# Patient Record
Sex: MALE | Race: WHITE | Employment: OTHER | URBAN - METROPOLITAN AREA
[De-identification: names, ages, dates, MRNs, and addresses within clinical notes are randomized per-mention and may not be internally consistent; named-entity substitution may affect disease eponyms.]

---

## 2017-05-03 RX ORDER — PRASUGREL 10 MG/1
10 TABLET, FILM COATED ORAL DAILY
Qty: 90 TABLET | Refills: 3 | Status: SHIPPED | OUTPATIENT
Start: 2017-05-03 | End: 2019-09-25 | Stop reason: SDUPTHER

## 2018-11-12 ENCOUNTER — TELEPHONE (OUTPATIENT)
Dept: CARDIOLOGY CLINIC | Age: 58
End: 2018-11-12

## 2018-11-12 ENCOUNTER — OFFICE VISIT (OUTPATIENT)
Dept: CARDIOLOGY CLINIC | Age: 58
End: 2018-11-12
Payer: COMMERCIAL

## 2018-11-12 VITALS
HEIGHT: 73 IN | HEART RATE: 58 BPM | DIASTOLIC BLOOD PRESSURE: 88 MMHG | SYSTOLIC BLOOD PRESSURE: 138 MMHG | BODY MASS INDEX: 25.71 KG/M2 | WEIGHT: 194 LBS

## 2018-11-12 DIAGNOSIS — Z98.62 S/P ANGIOPLASTY: Primary | ICD-10-CM

## 2018-11-12 DIAGNOSIS — Z01.810 PREOP CARDIOVASCULAR EXAM: ICD-10-CM

## 2018-11-12 DIAGNOSIS — I73.9 PAD (PERIPHERAL ARTERY DISEASE) (HCC): ICD-10-CM

## 2018-11-12 DIAGNOSIS — I21.09 ST ELEVATION MYOCARDIAL INFARCTION (STEMI) OF ANTERIOR WALL, INITIAL EPISODE OF CARE (HCC): ICD-10-CM

## 2018-11-12 PROCEDURE — 93000 ELECTROCARDIOGRAM COMPLETE: CPT | Performed by: INTERNAL MEDICINE

## 2018-11-12 PROCEDURE — 99214 OFFICE O/P EST MOD 30 MIN: CPT | Performed by: INTERNAL MEDICINE

## 2018-11-12 RX ORDER — ENALAPRIL MALEATE 10 MG/1
TABLET ORAL
Refills: 5 | COMMUNITY
Start: 2018-10-12 | End: 2020-11-16

## 2018-11-12 RX ORDER — INSULIN GLARGINE 300 U/ML
INJECTION, SOLUTION SUBCUTANEOUS
Refills: 2 | COMMUNITY
Start: 2018-10-12 | End: 2020-12-07

## 2018-11-12 NOTE — PROGRESS NOTES
exam in normal, teeth, gum and palate are normal, oral mucosa is normal without any notation of pallor or cyanosis  Neck - Supple. No jugular venous distention noted. No carotid bruits, no apical -carotid delay  Respiratory:  Normal breath sounds, No respiratory distress, No wheezing, No chest tenderness. ,no use of accessory muscles, diaphragm movement is normal  Cardiovascular: (PMI) apex non displaced,no lifts no thrills, no s3,no s4, Normal heart rate, Normal rhythm, No murmurs, No rubs, No gallops. Carotid arteries pulse and amplitude are normal no bruit, no abdominal bruit noted ( normal abdominal aorta ausculation), femoral arteries pulse and amplitude are normal no bruit, pedal pulses are normal  Femoral pulses have normal amplitude, no bruits   Extremities - No cyanosis, clubbing, or significant edema, no varicose veins    Abdomen - No masses, tenderness, or organomegaly, no hepato-splenomegally, no bruits  Musculoskeletal - No significant edema, no kyphosis or scoliosis, no deformity in any extremity noted, muscle strength and tone are normal  Skin: no ulcer,no scar,no stasis dermatitis   Neurologic - alert oriented times 3,Cranial nerves II through XII are grossly intact. There were no gross focal neurologic abnormalities. All sensory and motor nerves examined and were normal  Psychiatric: normal mood and affect    No results found for: CKTOTAL, CKMB, CKMBINDEX, TROPONINI  BNP:  No results found for: BNP  PT/INR:  No results found for: PTINR  Lab Results   Component Value Date    LABA1C 8.7 (H) 07/27/2016    LABA1C 8.5 (H) 04/22/2016     Lab Results   Component Value Date    CHOL 217 (H) 07/27/2016    TRIG 262 (H) 07/27/2016    HDL 32 (L) 07/27/2016    LDLDIRECT 147 (H) 07/27/2016     Lab Results   Component Value Date    ALT 17 07/31/2018    AST 13 (L) 07/31/2018     TSH:  No results found for: TSH    Impression:  Iris Servin is a 59 y. o.year old who  has a past medical history of Abnormal angiogram; Acute MI, anterior wall ([facility] CAD S/P percutaneous coronary angioplasty; Chest pain; Diabetes mellitus ([facility] H/O cardiovascular stress test; H/O Doppler ultrasound; H/O heart artery stent; H/O percutaneous left heart catheterization; H/O percutaneous left heart catheterization; H/O percutaneous left heart catheterization; H/O unstable angina; H/O unstable angina; History of Doppler ultrasound; Hypertension; Lower ext. Arterial Doppler; PAD (peripheral artery disease) ([facility] S/P angioplasty with stent; Smoker; and STEMI (ST elevation myocardial infarction) ([facility] and presents with     Plan:  1. Preop\" ok to hold effient for dental work.echo ordered  2. CAD:stable, Continue aspirin and plavix for atleast one yr, continue statins, ACEinhibitors, betablockers  3. DM: stable continue metformin   4. Dyslipidemia: uncontrolled,continue statins and add praluent  5. HTN: stable, continue lopressor and lisinopril medications  6. PAD: stable, recheck arterial doppler./  7. Health maintenance: exerise and diet  All labs, medications and tests reviewed, continue all other medications of all above medical condition listed as is.     [unfilled]

## 2018-11-12 NOTE — PATIENT INSTRUCTIONS
Please remember to bring all medication bottles or a medication list with you to your appointment. If you have any questions, please call our office at [phone].

## 2018-11-21 ENCOUNTER — TELEPHONE (OUTPATIENT)
Dept: CARDIOLOGY CLINIC | Age: 58
End: 2018-11-21

## 2019-05-13 ENCOUNTER — OFFICE VISIT (OUTPATIENT)
Dept: CARDIOLOGY CLINIC | Age: 59
End: 2019-05-13
Payer: COMMERCIAL

## 2019-05-13 VITALS
BODY MASS INDEX: 25.18 KG/M2 | WEIGHT: 190 LBS | DIASTOLIC BLOOD PRESSURE: 80 MMHG | HEART RATE: 60 BPM | HEIGHT: 73 IN | SYSTOLIC BLOOD PRESSURE: 132 MMHG

## 2019-05-13 DIAGNOSIS — I25.10 CORONARY ARTERY DISEASE INVOLVING NATIVE CORONARY ARTERY OF NATIVE HEART WITHOUT ANGINA PECTORIS: Primary | ICD-10-CM

## 2019-05-13 PROCEDURE — 99214 OFFICE O/P EST MOD 30 MIN: CPT | Performed by: INTERNAL MEDICINE

## 2019-05-13 RX ORDER — SILDENAFIL 100 MG/1
100 TABLET, FILM COATED ORAL DAILY PRN
Qty: 6 TABLET | Refills: 3 | Status: SHIPPED | OUTPATIENT
Start: 2019-05-13 | End: 2020-07-16 | Stop reason: SDUPTHER

## 2019-05-13 NOTE — PROGRESS NOTES
Christopher Monge MD        OFFICE  FOLLOWUP NOTE    Chief complaints: patient is here for management of CAD, DM, HTN, PAD, DYSLPIDEMIA    Subjective: he vomited last night, he is dropped off    HPI Jagdeep Leong is a 62 y. o.year old who  has a past medical history of Abnormal angiogram, Acute MI, anterior wall ([facility] CAD S/P percutaneous coronary angioplasty, Chest pain, Diabetes mellitus ([facility] H/O cardiovascular stress test, H/O Doppler ultrasound, H/O heart artery stent, H/O percutaneous left heart catheterization, H/O percutaneous left heart catheterization, H/O percutaneous left heart catheterization, H/O unstable angina, H/O unstable angina, History of Doppler ultrasound, Hypertension, Lower ext. Arterial Doppler, PAD (peripheral artery disease) ([facility] S/P angioplasty with stent, Smoker, and STEMI (ST elevation myocardial infarction) ([facility] and presents for management of CAD, DM, HTN, PAD, DYSLPIDEMIA which are well controlled      Current Outpatient Medications   Medication Sig Dispense Refill    enalapril (VASOTEC) 10 MG tablet TAKE 1 TABLET BY MOUTH DAILY  5    TOUJEO SOLOSTAR 300 UNIT/ML injection pen INJECT 10 UNITS SUB Q EVERY EVENING  2    prasugrel (EFFIENT) 10 MG TABS Take 1 tablet by mouth daily 90 tablet 3    enalapril (VASOTEC) 5 MG tablet TAKE 1 TABLET BY MOUTH DAILY 30 tablet 10    atorvastatin (LIPITOR) 80 MG tablet Take 1 tablet by mouth daily 90 tablet 3    metoprolol (LOPRESSOR) 25 MG tablet Take 0.5 tablets by mouth 2 times daily (Patient taking differently: Take 25 mg by mouth 2 times daily ) 30 tablet 5    amLODIPine (NORVASC) 10 MG tablet Take 10 mg by mouth daily.  aspirin 81 MG EC tablet Take 1 tablet by mouth daily. 30 tablet 3    metformin (GLUCOPHAGE-XR) 500 MG XR tablet Take 500 mg by mouth daily (with breakfast)        No current facility-administered medications for this visit. Allergies: Patient has no known allergies.   Past Medical History: Diagnosis Date    Abnormal angiogram 4/15/15    100% left iliac instent restenosis, UnSuccessful PTA of rt iliac artery, PTA @ [facility]    Acute MI, anterior wall ([address] 3/1/15    CAD S/P percutaneous coronary angioplasty 3/16/15    2.75 X 12 MM, 3.00 X 30 MM 2.75 X 8 MM rca     Chest pain     Diabetes mellitus ([address]     H/O cardiovascular stress test 3/4/2016    treadmill    H/O Doppler ultrasound 3/12/15    Arterial doppler. Abnormal right leg NOLAN suggestive of moderate to severe stenosis in the area of internal iliac artery which appears to be completely stenotic. I would recommend right lower extremity angiography.  H/O heart artery stent 3/17/16    3.0 X 22 MM & 3.0 X 18 MM sharmaine to RCA.    H/O percutaneous left heart catheterization 3/1/15    100% LAD, 90% RCA, 100% R. iliac stenosis    H/O percutaneous left heart catheterization 3/16/15    95% mid & 70% prox RCA    H/O percutaneous left heart catheterization 3/17/16    RCA mid 80%,     H/O unstable angina     H/O unstable angina     History of Doppler ultrasound 04/22/2016    Carotid-there is no flow limiting lesions BLT     Hypertension     Lower ext.  Arterial Doppler 10/26/2016    The  study is normal    PAD (peripheral artery disease) ([facility])     100% rt iliac stenosis    S/P angioplasty with stent 3/1/15    Successful aspiration thrombectomy and stenting of the proximal LAD    Smoker     STEMI (ST elevation myocardial infarction) ([facility] 3/1/15     Past Surgical History:   Procedure Laterality Date    CARDIAC CATHETERIZATION  3/1/15    100% LAD, 90% RCA, 100% R. iliac stenosis    CARDIAC CATHETERIZATION  3/16/15    95% mid & 70% prox RCA    CARDIAC CATHETERIZATION  3/17/16    RCA mid 80%,     CORONARY ANGIOPLASTY WITH STENT PLACEMENT  3/1/15    Successful aspiration thrombectomy and stenting of the proximal LAD    CORONARY ANGIOPLASTY WITH STENT PLACEMENT  3/16/15    2.75 X 12 MM, 3.00 X 30 MM 2.75 X 8 MM rca     CORONARY ANGIOPLASTY WITH STENT PLACEMENT  3/17/16    3.0 X 22 MM & 3.0 X 18 MM sharmaine to RCA. Family History   Problem Relation Age of Onset    Cancer Father     Diabetes Father     Heart Disease Father     High Blood Pressure Father      Social History     Tobacco Use    Smoking status: Former Smoker     Packs/day: 0.25     Years: 25.00     Pack years: 6.25     Types: Cigarettes     Last attempt to quit: 3/1/2015     Years since quittin.2    Smokeless tobacco: Never Used    Tobacco comment: reviewed 08/26/15   Substance Use Topics    Alcohol use: No     Alcohol/week: 0.0 oz      [unfilled]  Review of Systems:   · Constitutional: No Fever or Weight Loss   · Eyes: No Decreased Vision  · ENT: No Headaches, Hearing Loss or Vertigo  · Cardiovascular: No chest pain, dyspnea on exertion, palpitations or loss of consciousness  · Respiratory: No cough or wheezing    · Gastrointestinal: No abdominal pain, appetite loss, blood in stools, constipation, diarrhea or heartburn  · Genitourinary: No dysuria, trouble voiding, or hematuria  · Musculoskeletal:  No gait disturbance, weakness or joint complaints  · Integumentary: No rash or pruritis  · Neurological: No TIA or stroke symptoms  · Psychiatric: No anxiety or depression  · Endocrine: No malaise, fatigue or temperature intolerance  · Hematologic/Lymphatic: No bleeding problems, blood clots or swollen lymph nodes  · Allergic/Immunologic: No nasal congestion or hives  All systems negative except as marked. Objective:  /80   Pulse 60   Ht 6' 1\" (1.854 m)   Wt 190 lb (86.2 kg)   BMI 25.07 kg/m²   Wt Readings from Last 3 Encounters:   19 190 lb (86.2 kg)   18 194 lb (88 kg)   18 198 lb (89.8 kg)     Body mass index is 25.07 kg/m².   GENERAL - Alert, oriented, pleasant, in no apparent distress,normal grooming  HEENT - pupils are reactive to light and accomodation, cornea intact, conjunctive normal color, ears are normal in exam,throat exam in normal, teeth, gum and palate are normal, oral mucosa is normal without any notation of pallor or cyanosis  Neck - Supple. No jugular venous distention noted. No carotid bruits, no apical -carotid delay  Respiratory:  Normal breath sounds, No respiratory distress, No wheezing, No chest tenderness. ,no use of accessory muscles, diaphragm movement is normal  Cardiovascular: (PMI) apex non displaced,no lifts no thrills, no s3,no s4, Normal heart rate, Normal rhythm, No murmurs, No rubs, No gallops. Carotid arteries pulse and amplitude are normal no bruit, no abdominal bruit noted ( normal abdominal aorta ausculation), femoral arteries pulse and amplitude are normal no bruit, pedal pulses are normal  Femoral pulses have normal amplitude, no bruits   Extremities - No cyanosis, clubbing, or significant edema, no varicose veins    Abdomen - No masses, tenderness, or organomegaly, no hepato-splenomegally, no bruits  Musculoskeletal - No significant edema, no kyphosis or scoliosis, no deformity in any extremity noted, muscle strength and tone are normal  Skin: no ulcer,no scar,no stasis dermatitis   Neurologic - alert oriented times 3,Cranial nerves II through XII are grossly intact. There were no gross focal neurologic abnormalities. All sensory and motor nerves examined and were normal  Psychiatric: normal mood and affect    No results found for: CKTOTAL, CKMB, CKMBINDEX, TROPONINI  BNP:  No results found for: BNP  PT/INR:  No results found for: PTINR  Lab Results   Component Value Date    LABA1C 8.7 (H) 07/27/2016    LABA1C 8.5 (H) 04/22/2016     Lab Results   Component Value Date    CHOL 217 (H) 07/27/2016    TRIG 262 (H) 07/27/2016    HDL 32 (L) 07/27/2016    LDLDIRECT 147 (H) 07/27/2016     Lab Results   Component Value Date    ALT 17 07/31/2018    AST 13 (L) 07/31/2018     TSH:  No results found for: TSH    Impression:  Candy Zamora is a 62 y. o.year old who  has a past medical history of Abnormal angiogram, Acute MI, anterior wall ([address] CAD S/P percutaneous coronary angioplasty, Chest pain, Diabetes mellitus ([facility] H/O cardiovascular stress test, H/O Doppler ultrasound, H/O heart artery stent, H/O percutaneous left heart catheterization, H/O percutaneous left heart catheterization, H/O percutaneous left heart catheterization, H/O unstable angina, H/O unstable angina, History of Doppler ultrasound, Hypertension, Lower ext. Arterial Doppler, PAD (peripheral artery disease) ([facility] S/P angioplasty with stent, Smoker, and STEMI (ST elevation myocardial infarction) ([facility] and presents with     Plan:  1. Vomiting:recommend to see PMD  2. CAD:stable, Continue aspirin and plavix for atleast one yr, continue statins, ACEinhibitors, betablockers, stress test ordered  3. DM: stable continue metformin   4. Dyslipidemia:recheck  Lipids, last time it was not controlled  5. HTN: stable, continue lopressor and lisinopril medications  6. PAD: stable, recheck arterial doppler.   7. ED: add viagra

## 2019-05-13 NOTE — PATIENT INSTRUCTIONS
Please hold on to these instructions the  will call you within 1-9 business days when we receive authorization from your insurance. Nuclear Stress Test    WHAT TO EXPECT:   ? You will need to confirm the test or it could be cancelled. ? This test will take approximately 2 hours: 1 hour in the AM &    1 hour in the PM. You will be given a time by the [facility] after the first  part is completed to come back. ? You will be given a medication, through an IV in the hand, this will safely simulate exercise. This IV is also needed to inject the radioactive isotope. ? You will receive an injection in the AM & PM before the pictures. ? Using a special camera, you will have one set of pictures of your heart taken in the AM and a set of pictures in the PM.     PREPARATION FOR TEST:  ? Eat a light breakfast such as juice and toast.  ? If you are DIABETIC: Eat a normal breakfast with NO CAFFEINE and take your insulin as normal.   ? AVOID ALL FOODS & DRINKS containing CAFFEINE 24 HOURS PRIOR TO THE TEST: Including coffee, Tea, Crescencio and other soft drinks even those labeled  caffeine free or decaffeinated.  ? HOLD THESE MEDICATIONS Persantine & Theophylline (Theodur)  24 hours prior & bring your inhaler with you. ?  The physician will specify if the following Beta blockers need to be held for 24 hours prior to test: Lopressor (metoprolol)

## 2019-05-15 ENCOUNTER — TELEPHONE (OUTPATIENT)
Dept: CARDIOLOGY CLINIC | Age: 59
End: 2019-05-15

## 2019-05-20 ENCOUNTER — TELEPHONE (OUTPATIENT)
Dept: CARDIOLOGY CLINIC | Age: 59
End: 2019-05-20

## 2019-05-20 NOTE — TELEPHONE ENCOUNTER
Called patient to schedule Treadmill, no answer. Left message for patient to call office to schedule.  Tangela GARCIA AUTH NEEDED OK TO SCHEDULE

## 2019-06-03 ENCOUNTER — PROCEDURE VISIT (OUTPATIENT)
Dept: CARDIOLOGY CLINIC | Age: 59
End: 2019-06-03
Payer: COMMERCIAL

## 2019-06-03 VITALS
HEIGHT: 73 IN | SYSTOLIC BLOOD PRESSURE: 148 MMHG | BODY MASS INDEX: 25.18 KG/M2 | WEIGHT: 190 LBS | DIASTOLIC BLOOD PRESSURE: 80 MMHG | HEART RATE: 63 BPM

## 2019-06-03 DIAGNOSIS — I25.10 CORONARY ARTERY DISEASE INVOLVING NATIVE CORONARY ARTERY OF NATIVE HEART WITHOUT ANGINA PECTORIS: ICD-10-CM

## 2019-06-03 DIAGNOSIS — R94.39 ABNORMAL STRESS TEST: ICD-10-CM

## 2019-06-03 DIAGNOSIS — R94.31 ABNORMAL EKG: Primary | ICD-10-CM

## 2019-06-03 DIAGNOSIS — R07.9 CHEST PAIN, UNSPECIFIED TYPE: ICD-10-CM

## 2019-06-03 DIAGNOSIS — R07.9 CHEST PAIN, UNSPECIFIED TYPE: Primary | ICD-10-CM

## 2019-06-03 DIAGNOSIS — R06.02 SHORTNESS OF BREATH: ICD-10-CM

## 2019-06-03 PROCEDURE — 93015 CV STRESS TEST SUPVJ I&R: CPT | Performed by: INTERNAL MEDICINE

## 2019-06-03 NOTE — PROGRESS NOTES
Reg.GXT completed @ 8213. Had Margie Sears NP review GXT. She also reviewed pt's last EKG from  w/today's EKGs. She spoke w/ pt's cardiologist per phone about pt's abn.stress test & EKGs, d/t DrHerberthof day not in office until 0900. Dacosta Judy wants pt to have NM. Informed pt of this & he voiced understanding w/agreement. NM placed in Epic as future order & informed Alana. Pt is aware that this office will call him back to schedule NM after his insurance is called to make sure study is covered.

## 2019-06-24 ENCOUNTER — PROCEDURE VISIT (OUTPATIENT)
Dept: CARDIOLOGY CLINIC | Age: 59
End: 2019-06-24
Payer: COMMERCIAL

## 2019-06-24 DIAGNOSIS — R94.39 ABNORMAL STRESS TEST: ICD-10-CM

## 2019-06-24 DIAGNOSIS — R07.9 CHEST PAIN, UNSPECIFIED TYPE: ICD-10-CM

## 2019-06-24 DIAGNOSIS — R94.31 ABNORMAL EKG: ICD-10-CM

## 2019-06-24 DIAGNOSIS — I25.10 CORONARY ARTERY DISEASE INVOLVING NATIVE CORONARY ARTERY OF NATIVE HEART WITHOUT ANGINA PECTORIS: ICD-10-CM

## 2019-06-24 DIAGNOSIS — R06.02 SHORTNESS OF BREATH: ICD-10-CM

## 2019-06-24 LAB
LV EF: 49 %
LVEF MODALITY: NORMAL

## 2019-06-24 PROCEDURE — 78452 HT MUSCLE IMAGE SPECT MULT: CPT | Performed by: INTERNAL MEDICINE

## 2019-06-24 PROCEDURE — 93017 CV STRESS TEST TRACING ONLY: CPT | Performed by: INTERNAL MEDICINE

## 2019-06-24 PROCEDURE — 93018 CV STRESS TEST I&R ONLY: CPT | Performed by: INTERNAL MEDICINE

## 2019-06-24 PROCEDURE — 93016 CV STRESS TEST SUPVJ ONLY: CPT | Performed by: INTERNAL MEDICINE

## 2019-06-24 PROCEDURE — A9500 TC99M SESTAMIBI: HCPCS | Performed by: INTERNAL MEDICINE

## 2019-06-25 ENCOUNTER — TELEPHONE (OUTPATIENT)
Dept: CARDIOLOGY CLINIC | Age: 59
End: 2019-06-25

## 2019-06-25 NOTE — TELEPHONE ENCOUNTER
Left VM for pt to call me for results. Summary    Supervising physician Dr. Bjorn Munguia .abnormal stress test, slightly depressed    LVEF.  Myocardial perfusion scan shows small size, moderate intensity,    reversible perfusion defect in anterior wall.        Recommendation    office visit to discuss results

## 2019-07-05 ENCOUNTER — OFFICE VISIT (OUTPATIENT)
Dept: CARDIOLOGY CLINIC | Age: 59
End: 2019-07-05
Payer: COMMERCIAL

## 2019-07-05 VITALS
HEART RATE: 68 BPM | SYSTOLIC BLOOD PRESSURE: 148 MMHG | WEIGHT: 195.4 LBS | BODY MASS INDEX: 25.9 KG/M2 | DIASTOLIC BLOOD PRESSURE: 82 MMHG | HEIGHT: 73 IN

## 2019-07-05 DIAGNOSIS — I25.10 CORONARY ARTERY DISEASE INVOLVING NATIVE CORONARY ARTERY OF NATIVE HEART WITHOUT ANGINA PECTORIS: Primary | ICD-10-CM

## 2019-07-05 PROCEDURE — 99214 OFFICE O/P EST MOD 30 MIN: CPT | Performed by: INTERNAL MEDICINE

## 2019-07-05 NOTE — PROGRESS NOTES
Jerry Prakash MD        OFFICE  FOLLOWUP NOTE    Chief complaints: patient is here for management of  CAD, DM, HTN, PAD, DYSLPIDEMIA      Subjective: patient feels tired in the extreme heat    HPI Abner Dennis is a 62 y. o.year old who  has a past medical history of Abnormal angiogram, Chest pain, Diabetes mellitus ([facility] H/O cardiovascular stress test, H/O Doppler ultrasound, H/O heart artery stent, H/O percutaneous left heart catheterization, H/O percutaneous left heart catheterization, H/O percutaneous left heart catheterization, H/O unstable angina, History of Doppler ultrasound, Hypertension, Lower ext. Arterial Doppler, PAD (peripheral artery disease) ([facility] S/P angioplasty with stent, Smoker, and STEMI (ST elevation myocardial infarction) ([facility] and presents for management of  CAD, DM, HTN, PAD, DYSLPIDEMIA which are well controlled. His stress test suggested some EKG ischemia and ? Anterior wall ischemia    Current Outpatient Medications   Medication Sig Dispense Refill    sildenafil (VIAGRA) 100 MG tablet Take 1 tablet by mouth daily as needed for Erectile Dysfunction 6 tablet 3    enalapril (VASOTEC) 10 MG tablet TAKE 1 TABLET BY MOUTH DAILY  5    TOUJEO SOLOSTAR 300 UNIT/ML injection pen INJECT 10 UNITS SUB Q EVERY EVENING  2    prasugrel (EFFIENT) 10 MG TABS Take 1 tablet by mouth daily 90 tablet 3    enalapril (VASOTEC) 5 MG tablet TAKE 1 TABLET BY MOUTH DAILY 30 tablet 10    atorvastatin (LIPITOR) 80 MG tablet Take 1 tablet by mouth daily 90 tablet 3    metoprolol (LOPRESSOR) 25 MG tablet Take 0.5 tablets by mouth 2 times daily (Patient taking differently: Take 25 mg by mouth 2 times daily ) 30 tablet 5    amLODIPine (NORVASC) 10 MG tablet Take 10 mg by mouth daily.  aspirin 81 MG EC tablet Take 1 tablet by mouth daily.  30 tablet 3    metformin (GLUCOPHAGE-XR) 500 MG XR tablet Take 500 mg by mouth daily (with breakfast)        No current facility-administered medications for Diabetes mellitus ([facility] H/O cardiovascular stress test, H/O Doppler ultrasound, H/O heart artery stent, H/O percutaneous left heart catheterization, H/O percutaneous left heart catheterization, H/O percutaneous left heart catheterization, H/O unstable angina, History of Doppler ultrasound, Hypertension, Lower ext. Arterial Doppler, P[facility]peripheral artery disease) [facility] S/P angioplasty with stent, Smoker, and STEMI (ST elevation myocardial infarction) ([facility] and presents with     Plan:  1. Abnormal stress test: reviewed stress test personally, ischemia is very mild if at all, will not proceed with [facility]. 2. CAD:stable, Continue aspirin and plavix for atleast one yr, continue statins, ACEinhibitors, betablockers  3. DM: stable continue metformin   4. Dyslipidemia:recheck  Lipids, last time it was not controlled  5. HTN: stable, continue lopressor and lisinopril medications  6. PAD: stable, recheck arterial doppler. 7. ED: add viagra      All labs, medications and tests reviewed, continue all other medications of all above medical condition listed as is.

## 2019-07-11 ENCOUNTER — HOSPITAL ENCOUNTER (EMERGENCY)
Age: 59
Discharge: HOME | End: 2019-07-11
Attending: EMERGENCY MEDICINE
Payer: COMMERCIAL

## 2019-07-11 ENCOUNTER — APPOINTMENT (OUTPATIENT)
Dept: CT IMAGING | Age: 59
End: 2019-07-11
Payer: COMMERCIAL

## 2019-07-11 VITALS
HEIGHT: 73 IN | TEMPERATURE: 97.9 F | DIASTOLIC BLOOD PRESSURE: 85 MMHG | WEIGHT: 196 LBS | BODY MASS INDEX: 25.98 KG/M2 | SYSTOLIC BLOOD PRESSURE: 167 MMHG | HEART RATE: 52 BPM | OXYGEN SATURATION: 96 % | RESPIRATION RATE: 18 BRPM

## 2019-07-11 DIAGNOSIS — N20.0 KIDNEY STONE: ICD-10-CM

## 2019-07-11 DIAGNOSIS — R10.9 LEFT FLANK PAIN: Primary | ICD-10-CM

## 2019-07-11 LAB
ANION GAP SERPL CALCULATED.3IONS-SCNC: 13 MMOL/L (ref 4–16)
APTT: 27.7 SECONDS (ref 21.2–33)
BACTERIA: NEGATIVE /HPF
BASOPHILS ABSOLUTE: 0 K/CU MM
BASOPHILS RELATIVE PERCENT: 0.3 % (ref 0–1)
BILIRUBIN URINE: NEGATIVE MG/DL
BLOOD, URINE: ABNORMAL
BUN BLDV-MCNC: 17 MG/DL (ref 6–23)
CALCIUM SERPL-MCNC: 9.3 MG/DL (ref 8.3–10.6)
CHLORIDE BLD-SCNC: 102 MMOL/L (ref 99–110)
CLARITY: ABNORMAL
CO2: 25 MMOL/L (ref 21–32)
COLOR: ABNORMAL
CREAT SERPL-MCNC: 1.2 MG/DL (ref 0.9–1.3)
DIFFERENTIAL TYPE: ABNORMAL
EOSINOPHILS ABSOLUTE: 0.3 K/CU MM
EOSINOPHILS RELATIVE PERCENT: 2.5 % (ref 0–3)
GFR AFRICAN AMERICAN: >60 ML/MIN/1.73M2
GFR NON-AFRICAN AMERICAN: >60 ML/MIN/1.73M2
GLUCOSE BLD-MCNC: 231 MG/DL (ref 70–99)
GLUCOSE, URINE: 150 MG/DL
HCT VFR BLD CALC: 45.6 % (ref 42–52)
HEMOGLOBIN: 15.2 GM/DL (ref 13.5–18)
IMMATURE NEUTROPHIL %: 0.4 % (ref 0–0.43)
INR BLD: 0.9 INDEX
KETONES, URINE: NEGATIVE MG/DL
LEUKOCYTE ESTERASE, URINE: NEGATIVE
LYMPHOCYTES ABSOLUTE: 3.1 K/CU MM
LYMPHOCYTES RELATIVE PERCENT: 28.2 % (ref 24–44)
MCH RBC QN AUTO: 29.5 PG (ref 27–31)
MCHC RBC AUTO-ENTMCNC: 33.3 % (ref 32–36)
MCV RBC AUTO: 88.5 FL (ref 78–100)
MONOCYTES ABSOLUTE: 1.1 K/CU MM
MONOCYTES RELATIVE PERCENT: 9.9 % (ref 0–4)
NITRITE URINE, QUANTITATIVE: NEGATIVE
NUCLEATED RBC %: 0 %
PDW BLD-RTO: 12.6 % (ref 11.7–14.9)
PH, URINE: 6 (ref 5–8)
PLATELET # BLD: 233 K/CU MM (ref 140–440)
PMV BLD AUTO: 11.5 FL (ref 7.5–11.1)
POTASSIUM SERPL-SCNC: 3.8 MMOL/L (ref 3.5–5.1)
PROTEIN UA: 100 MG/DL
PROTHROMBIN TIME: 10.5 SECONDS (ref 9.12–12.5)
RBC # BLD: 5.15 M/CU MM (ref 4.6–6.2)
RBC URINE: 2169 /HPF (ref 0–3)
SEGMENTED NEUTROPHILS ABSOLUTE COUNT: 6.4 K/CU MM
SEGMENTED NEUTROPHILS RELATIVE PERCENT: 58.7 % (ref 36–66)
SODIUM BLD-SCNC: 140 MMOL/L (ref 135–145)
SPECIFIC GRAVITY UA: 1.01 (ref 1–1.03)
TOTAL IMMATURE NEUTOROPHIL: 0.04 K/CU MM
TOTAL NUCLEATED RBC: 0 K/CU MM
TRICHOMONAS: ABNORMAL /HPF
UROBILINOGEN, URINE: NORMAL MG/DL (ref 0.2–1)
WBC # BLD: 10.9 K/CU MM (ref 4–10.5)
WBC CLUMP: ABNORMAL /HPF
WBC UA: 15 /HPF (ref 0–2)

## 2019-07-11 PROCEDURE — 96372 THER/PROPH/DIAG INJ SC/IM: CPT

## 2019-07-11 PROCEDURE — 80048 BASIC METABOLIC PNL TOTAL CA: CPT

## 2019-07-11 PROCEDURE — 85025 COMPLETE CBC W/AUTO DIFF WBC: CPT

## 2019-07-11 PROCEDURE — 96375 TX/PRO/DX INJ NEW DRUG ADDON: CPT

## 2019-07-11 PROCEDURE — 2580000003 HC RX 258: Performed by: EMERGENCY MEDICINE

## 2019-07-11 PROCEDURE — 6360000002 HC RX W HCPCS: Performed by: EMERGENCY MEDICINE

## 2019-07-11 PROCEDURE — 6370000000 HC RX 637 (ALT 250 FOR IP): Performed by: EMERGENCY MEDICINE

## 2019-07-11 PROCEDURE — 85730 THROMBOPLASTIN TIME PARTIAL: CPT

## 2019-07-11 PROCEDURE — 87086 URINE CULTURE/COLONY COUNT: CPT

## 2019-07-11 PROCEDURE — 99284 EMERGENCY DEPT VISIT MOD MDM: CPT

## 2019-07-11 PROCEDURE — 74176 CT ABD & PELVIS W/O CONTRAST: CPT

## 2019-07-11 PROCEDURE — 85610 PROTHROMBIN TIME: CPT

## 2019-07-11 PROCEDURE — 96374 THER/PROPH/DIAG INJ IV PUSH: CPT

## 2019-07-11 PROCEDURE — 81001 URINALYSIS AUTO W/SCOPE: CPT

## 2019-07-11 RX ORDER — IBUPROFEN 600 MG/1
600 TABLET ORAL 3 TIMES DAILY PRN
Qty: 90 TABLET | Refills: 0 | Status: SHIPPED | OUTPATIENT
Start: 2019-07-11 | End: 2020-11-16

## 2019-07-11 RX ORDER — ONDANSETRON 2 MG/ML
4 INJECTION INTRAMUSCULAR; INTRAVENOUS ONCE
Status: COMPLETED | OUTPATIENT
Start: 2019-07-11 | End: 2019-07-11

## 2019-07-11 RX ORDER — 0.9 % SODIUM CHLORIDE 0.9 %
500 INTRAVENOUS SOLUTION INTRAVENOUS ONCE
Status: COMPLETED | OUTPATIENT
Start: 2019-07-11 | End: 2019-07-11

## 2019-07-11 RX ORDER — ONDANSETRON 4 MG/1
4 TABLET, ORALLY DISINTEGRATING ORAL ONCE
Status: COMPLETED | OUTPATIENT
Start: 2019-07-11 | End: 2019-07-11

## 2019-07-11 RX ORDER — TAMSULOSIN HYDROCHLORIDE 0.4 MG/1
0.4 CAPSULE ORAL DAILY
Qty: 14 CAPSULE | Refills: 0 | Status: SHIPPED | OUTPATIENT
Start: 2019-07-11 | End: 2020-11-16

## 2019-07-11 RX ORDER — MORPHINE SULFATE 4 MG/ML
4 INJECTION, SOLUTION INTRAMUSCULAR; INTRAVENOUS ONCE
Status: COMPLETED | OUTPATIENT
Start: 2019-07-11 | End: 2019-07-11

## 2019-07-11 RX ORDER — KETOROLAC TROMETHAMINE 30 MG/ML
15 INJECTION, SOLUTION INTRAMUSCULAR; INTRAVENOUS ONCE
Status: COMPLETED | OUTPATIENT
Start: 2019-07-11 | End: 2019-07-11

## 2019-07-11 RX ORDER — HYDROCODONE BITARTRATE AND ACETAMINOPHEN 5; 325 MG/1; MG/1
1 TABLET ORAL EVERY 6 HOURS PRN
Qty: 10 TABLET | Refills: 0 | Status: SHIPPED | OUTPATIENT
Start: 2019-07-11 | End: 2019-07-14

## 2019-07-11 RX ADMIN — SODIUM CHLORIDE 500 ML: 9 INJECTION, SOLUTION INTRAVENOUS at 19:09

## 2019-07-11 RX ADMIN — MORPHINE SULFATE 4 MG: 4 INJECTION INTRAVENOUS at 19:10

## 2019-07-11 RX ADMIN — ONDANSETRON 4 MG: 4 TABLET, ORALLY DISINTEGRATING ORAL at 21:06

## 2019-07-11 RX ADMIN — KETOROLAC TROMETHAMINE 15 MG: 30 INJECTION, SOLUTION INTRAMUSCULAR; INTRAVENOUS at 21:05

## 2019-07-11 RX ADMIN — ONDANSETRON 4 MG: 2 INJECTION INTRAMUSCULAR; INTRAVENOUS at 19:10

## 2019-07-11 ASSESSMENT — PAIN DESCRIPTION - PAIN TYPE: TYPE: ACUTE PAIN

## 2019-07-11 ASSESSMENT — PAIN SCALES - GENERAL
PAINLEVEL_OUTOF10: 7
PAINLEVEL_OUTOF10: 3
PAINLEVEL_OUTOF10: 8
PAINLEVEL_OUTOF10: 3

## 2019-07-11 ASSESSMENT — PAIN DESCRIPTION - LOCATION: LOCATION: FLANK

## 2019-07-11 ASSESSMENT — ENCOUNTER SYMPTOMS
SHORTNESS OF BREATH: 0
NAUSEA: 1
BACK PAIN: 0
ABDOMINAL PAIN: 0
VOMITING: 1

## 2019-07-11 ASSESSMENT — PAIN DESCRIPTION - ORIENTATION: ORIENTATION: LEFT

## 2019-07-13 LAB
CULTURE: NORMAL
Lab: NORMAL
SPECIMEN: NORMAL

## 2019-08-18 DIAGNOSIS — I77.9 PAOD (PERIPHERAL ARTERIAL OCCLUSIVE DISEASE) (HCC): ICD-10-CM

## 2019-08-18 DIAGNOSIS — N20.0 KIDNEY STONE: ICD-10-CM

## 2019-08-18 DIAGNOSIS — Z98.61 STATUS POST PERCUTANEOUS TRANSLUMINAL CORONARY ANGIOPLASTY: ICD-10-CM

## 2019-08-18 DIAGNOSIS — R79.89 DECREASED TESTOSTERONE LEVEL: ICD-10-CM

## 2019-08-18 DIAGNOSIS — I25.2 HX OF MYOCARDIAL INFARCTION: ICD-10-CM

## 2019-09-25 ENCOUNTER — TELEPHONE (OUTPATIENT)
Dept: FAMILY MEDICINE CLINIC | Age: 59
End: 2019-09-25

## 2019-09-25 RX ORDER — PRASUGREL 10 MG/1
10 TABLET, FILM COATED ORAL DAILY
Qty: 30 TABLET | Refills: 0 | Status: SHIPPED | OUTPATIENT
Start: 2019-09-25 | End: 2019-11-21 | Stop reason: SDUPTHER

## 2019-09-25 NOTE — TELEPHONE ENCOUNTER
PER PT RQST SENT PRASUGREL TO [address] DAY AND PT NEEDS ROV ON RX  Electronically signed by Jacquie Ormond, MA on 9/25/2019 at 11:35 AM

## 2019-11-26 RX ORDER — PRASUGREL 10 MG/1
10 TABLET, FILM COATED ORAL DAILY
Qty: 30 TABLET | Refills: 0 | OUTPATIENT
Start: 2019-11-26

## 2020-01-15 ENCOUNTER — OFFICE VISIT (OUTPATIENT)
Dept: FAMILY MEDICINE CLINIC | Age: 60
End: 2020-01-15
Payer: COMMERCIAL

## 2020-01-15 VITALS
HEART RATE: 73 BPM | SYSTOLIC BLOOD PRESSURE: 156 MMHG | BODY MASS INDEX: 26.58 KG/M2 | DIASTOLIC BLOOD PRESSURE: 98 MMHG | HEIGHT: 72 IN

## 2020-01-15 PROBLEM — E11.9 TYPE 2 DIABETES MELLITUS WITHOUT COMPLICATION, WITH LONG-TERM CURRENT USE OF INSULIN (HCC): Status: ACTIVE | Noted: 2020-01-15

## 2020-01-15 PROBLEM — Z79.4 TYPE 2 DIABETES MELLITUS WITHOUT COMPLICATION, WITH LONG-TERM CURRENT USE OF INSULIN (HCC): Status: ACTIVE | Noted: 2020-01-15

## 2020-01-15 PROCEDURE — 99214 OFFICE O/P EST MOD 30 MIN: CPT | Performed by: FAMILY MEDICINE

## 2020-01-15 ASSESSMENT — ENCOUNTER SYMPTOMS
COUGH: 0
SHORTNESS OF BREATH: 0
RESPIRATORY NEGATIVE: 1
CHEST TIGHTNESS: 0
WHEEZING: 0
ABDOMINAL PAIN: 0

## 2020-01-15 NOTE — PROGRESS NOTES
Medications   Medication Sig Dispense Refill    prasugrel (EFFIENT) 10 MG TABS TAKE 1 TABLET BY MOUTH DAILY 30 tablet 10    metoprolol tartrate (LOPRESSOR) 25 MG tablet Take 25 mg by mouth 2 times daily      ibuprofen (ADVIL;MOTRIN) 600 MG tablet Take 1 tablet by mouth 3 times daily as needed for Pain 90 tablet 0    enalapril (VASOTEC) 10 MG tablet TAKE 1 TABLET BY MOUTH DAILY  5    TOUJEO SOLOSTAR 300 UNIT/ML injection pen INJECT 10 UNITS SUB Q EVERY EVENING  2    atorvastatin (LIPITOR) 80 MG tablet Take 1 tablet by mouth daily 90 tablet 3    amLODIPine (NORVASC) 10 MG tablet Take 10 mg by mouth daily.  aspirin 81 MG EC tablet Take 1 tablet by mouth daily. 30 tablet 3    metformin (GLUCOPHAGE-XR) 500 MG XR tablet Take 500 mg by mouth daily (with breakfast)       blood glucose test strips (ACCU-CHEK GUIDE) strip 1 each by In Vitro route daily As needed.  Lancets Misc. (ACCU-CHEK MULTICLIX LANCET DEV) KIT by Does not apply route      tamsulosin (FLOMAX) 0.4 MG capsule Take 1 capsule by mouth daily for 14 days 14 capsule 0    sildenafil (VIAGRA) 100 MG tablet Take 1 tablet by mouth daily as needed for Erectile Dysfunction (Patient not taking: Reported on 1/15/2020) 6 tablet 3     No current facility-administered medications for this visit. ALLERGIES  No Known Allergies    PHYSICAL EXAM    BP (!) 156/98 (Site: Left Upper Arm, Position: Sitting, Cuff Size: Medium Adult)   Pulse 73   Ht 6' (1.829 m)   BMI 26.58 kg/m²     Physical Exam  Vitals signs and nursing note reviewed. Constitutional:       Appearance: Normal appearance. He is well-developed. HENT:      Right Ear: External ear normal.      Left Ear: External ear normal.      Mouth/Throat:      Pharynx: Oropharynx is clear. Eyes:      Conjunctiva/sclera: Conjunctivae normal.   Cardiovascular:      Rate and Rhythm: Normal rate and regular rhythm. Heart sounds: Normal heart sounds.    Pulmonary:      Effort: Pulmonary effort is

## 2020-03-06 RX ORDER — METFORMIN HYDROCHLORIDE 500 MG/1
500 TABLET, EXTENDED RELEASE ORAL
Qty: 30 TABLET | Refills: 5 | Status: SHIPPED | OUTPATIENT
Start: 2020-03-06 | End: 2020-11-16 | Stop reason: SDUPTHER

## 2020-03-25 PROBLEM — I20.0 UNSTABLE ANGINA (HCC): Status: RESOLVED | Noted: 2020-03-25 | Resolved: 2020-03-24

## 2020-06-10 RX ORDER — SILDENAFIL 100 MG/1
100 TABLET, FILM COATED ORAL DAILY PRN
Qty: 6 TABLET | Refills: 3 | Status: CANCELLED | OUTPATIENT
Start: 2020-06-10

## 2020-06-12 ENCOUNTER — VIRTUAL VISIT (OUTPATIENT)
Dept: FAMILY MEDICINE CLINIC | Age: 60
End: 2020-06-12
Payer: COMMERCIAL

## 2020-06-12 PROCEDURE — 99213 OFFICE O/P EST LOW 20 MIN: CPT | Performed by: PHYSICIAN ASSISTANT

## 2020-06-12 RX ORDER — POLYMYXIN B SULFATE AND TRIMETHOPRIM 1; 10000 MG/ML; [USP'U]/ML
1 SOLUTION OPHTHALMIC EVERY 4 HOURS
Qty: 1 BOTTLE | Refills: 0 | Status: SHIPPED | OUTPATIENT
Start: 2020-06-12 | End: 2020-06-22

## 2020-06-12 ASSESSMENT — ENCOUNTER SYMPTOMS
ABDOMINAL PAIN: 0
EYE ITCHING: 1
SORE THROAT: 0
CONSTIPATION: 0
COUGH: 0
RHINORRHEA: 0
VOMITING: 0
BLOOD IN STOOL: 0
SHORTNESS OF BREATH: 0
NAUSEA: 0
EYE PAIN: 1
DIARRHEA: 0

## 2020-06-12 ASSESSMENT — PATIENT HEALTH QUESTIONNAIRE - PHQ9
1. LITTLE INTEREST OR PLEASURE IN DOING THINGS: 0
2. FEELING DOWN, DEPRESSED OR HOPELESS: 0
SUM OF ALL RESPONSES TO PHQ QUESTIONS 1-9: 0
SUM OF ALL RESPONSES TO PHQ9 QUESTIONS 1 & 2: 0
SUM OF ALL RESPONSES TO PHQ QUESTIONS 1-9: 0

## 2020-07-16 RX ORDER — SILDENAFIL 100 MG/1
100 TABLET, FILM COATED ORAL DAILY PRN
Qty: 10 TABLET | Refills: 5 | Status: SHIPPED | OUTPATIENT
Start: 2020-07-16 | End: 2020-11-16

## 2020-10-05 ENCOUNTER — VIRTUAL VISIT (OUTPATIENT)
Dept: CARDIOLOGY CLINIC | Age: 60
End: 2020-10-05
Payer: COMMERCIAL

## 2020-10-05 PROCEDURE — 99441 PR PHYS/QHP TELEPHONE EVALUATION 5-10 MIN: CPT | Performed by: INTERNAL MEDICINE

## 2020-10-05 NOTE — PROGRESS NOTES
Hugh Caceres is a 56 y.o. male evaluated via telephone on 10/5/2020. Consent:  He and/or health care decision maker is aware that that he may receive a bill for this telephone service, depending on his insurance coverage, and has provided verbal consent to proceed: Yes      Documentation:  I communicated with the patient and/or health care decision maker about. Details of this discussion including any medical advice provided:       I affirm this is a Patient Initiated Episode with a Patient who has not had a related appointment within my department in the past 7 days or scheduled within the next 24 hours. Patient identification was verified at the start of the visit: Yes    Total Time: minutes: 5-10 minutes    Note: not billable if this call serves to triage the patient into an appointment for the relevant concern      [address] [place] [address], MD    TELEHEALTH EVALUATION -- Audio/Visual (During [id number] public health emergency)      Chief complaints: patient is here for management of CAD, DM, HTN, PAD, DYSLPIDEMIA    Subjective: + shortness of breath, no dizziness, no palpitations, leg pain    HPI Rey Walter is a 56 y. o.year old who  has a past medical history of Abnormal angiogram, Chest pain, Decreased testosterone level, Diabetes mellitus ([address] H/O cardiovascular stress test, H/O Doppler ultrasound, H/O heart artery stent, H/O percutaneous left heart catheterization, H/O percutaneous left heart catheterization, H/O percutaneous left heart catheterization, H/O unstable angina, History of Doppler ultrasound, Hx of myocardial infarction, Hyperlipidemia, Hypertension, Kidney stone, Lower ext. Arterial Doppler, PAD (peripheral artery disease) ([address] PAOD (peripheral arterial occlusive disease) ([address] S/P angioplasty with stent, Smoker, Status post percutaneous transluminal coronary angioplasty, and STEMI (ST elevation myocardial infarction) ([address]  and presents for management of severe stenosis in the area of internal iliac artery which appears to be completely stenotic. I would recommend right lower extremity angiography.  H/O heart artery stent 3/17/16    3.0 X 22 MM & 3.0 X 18 MM sharmaine to RCA.    H/O percutaneous left heart catheterization 3/1/15    100% LAD, 90% RCA, 100% R. iliac stenosis    H/O percutaneous left heart catheterization 3/16/15    95% mid & 70% prox RCA    H/O percutaneous left heart catheterization 3/17/16    RCA mid 80%,     H/O unstable angina     History of Doppler ultrasound 04/22/2016    Carotid-there is no flow limiting lesions BLT     Hx of myocardial infarction     Hyperlipidemia     Hypertension     Kidney stone     Lower ext. Arterial Doppler 10/26/2016    The  study is normal    PAD (peripheral artery disease) ([facility]     100% rt iliac stenosis    PAOD (peripheral arterial occlusive disease) ([facility])     S/P angioplasty with stent 3/1/15    Successful aspiration thrombectomy and stenting of the proximal LAD    Smoker     Status post percutaneous transluminal coronary angioplasty     STEMI (ST elevation myocardial infarction) ([address] 03/01/2015    Anterior Wall. Past Surgical History:   Procedure Laterality Date    CARDIAC CATHETERIZATION  3/1/15    100% LAD, 90% RCA, 100% R. iliac stenosis    CARDIAC CATHETERIZATION  3/16/15    95% mid & 70% prox RCA    CARDIAC CATHETERIZATION  3/17/16    RCA mid 80%,     CORONARY ANGIOPLASTY WITH STENT PLACEMENT  3/1/15    Successful aspiration thrombectomy and stenting of the proximal LAD    CORONARY ANGIOPLASTY WITH STENT PLACEMENT  3/16/15    2.75 X 12 MM, 3.00 X 30 MM 2.75 X 8 MM rca     CORONARY ANGIOPLASTY WITH STENT PLACEMENT  3/17/16    3.0 X 22 MM & 3.0 X 18 MM sharmaine to RCA.     TONSILLECTOMY       Family History   Problem Relation Age of Onset    Cancer Father     Diabetes Father     Heart Disease Father     Cancer Mother     Diabetes Mother     Heart Disease Mother      Social History Tobacco Use    Smoking status: Current Every Day Smoker     Packs/day: 1.50     Years: 25.00     Pack years: 37.50     Types: Cigarettes     Last attempt to quit: 3/1/2015     Years since quittin.6    Smokeless tobacco: Never Used   Substance Use Topics    Alcohol use: Yes     Alcohol/week: 0.0 standard drinks     Comment: 2/month      [unfilled]  Review of Systems:   · Constitutional: No Fever or Weight Loss   · Eyes: No Decreased Vision  · ENT: No Headaches, Hearing Loss or Vertigo  · Cardiovascular: No chest pain,+  dyspnea on exertion, palpitations or loss of consciousness  · Respiratory: No cough or wheezing    · Gastrointestinal: No abdominal pain, appetite loss, blood in stools, constipation, diarrhea or heartburn  · Genitourinary: No dysuria, trouble voiding, or hematuria  · Musculoskeletal:  No gait disturbance, weakness or joint complaints  · Integumentary: No rash or pruritis  · Neurological: No TIA or stroke symptoms  · Psychiatric: No anxiety or depression  · Endocrine: No malaise, fatigue or temperature intolerance  · Hematologic/Lymphatic: No bleeding problems, blood clots or swollen lymph nodes  · Allergic/Immunologic: No nasal congestion or hives  All systems negative except as marked. Objective: There were no vitals taken for this visit. Wt Readings from Last 3 Encounters:   19 196 lb (88.9 kg)   19 195 lb 6.4 oz (88.6 kg)   19 190 lb (86.2 kg)     There is no height or weight on file to calculate BMI.     No results found for: CKTOTAL, CKMB, CKMBINDEX, TROPONINI  BNP:  No results found for: BNP  PT/INR:  No results found for: PTINR  Lab Results   Component Value Date    LABA1C 8.7 (H) 2016    LABA1C 8.5 (H) 2016     Lab Results   Component Value Date    CHOL 217 (H) 2016    TRIG 262 (H) 2016    HDL 32 (L) 2016    LDLDIRECT 147 (H) 2016     Lab Results   Component Value Date    ALT 17 2018    AST 13 (L) 2018     TSH:  No results found for: TSH    Impression:  Tra Tovar is a 56 y. o.year old who  has a past medical history of Abnormal angiogram, Chest pain, Decreased testosterone level, Diabetes mellitus ([facility] H/O cardiovascular stress test, H/O Doppler ultrasound, H/O heart artery stent, H/O percutaneous left heart catheterization, H/O percutaneous left heart catheterization, H/O percutaneous left heart catheterization, H/O unstable angina, History of Doppler ultrasound, Hx of myocardial infarction, Hyperlipidemia, Hypertension, Kidney stone, Lower ext. Arterial Doppler, PAD (peripheral artery disease) ([facility]OD [facility]peripheral [facility] occlusive disease) [facility] S/P angioplasty with stent, Smoker, Status post percutaneous transluminal coronary angioplasty, and STEMI (ST elevation myocardial infarction) ([facility] and presents with     Plan:  1. CAD:stable, Continue aspirin and plavix for atleast one yr, continue statins, ACEinhibitors, betablockers, last time she had stress test with mild anterior wall ischemia, he will get better insurance in November, will repeat stress test  2. DM: stable continue metformin   3. Dyslipidemia:recheck  Lipids, last time it was not controlled  4. HTN: stable, continue lopressor and lisinopril medications  5. PAD: stable, recheck arterial doppler, and abdominal ultrasound also ordered, he had rt ILIAC artery stent. 6. ED: on viagra  7. All labs, medications and tests reviewed, continue all other medications of all above medical condition listed as is. Ajit Rajput is a 56 y.o. male being evaluated by a Virtual Visit ( visit) encounter to address concerns as mentioned above. A caregiver was present when appropriate. Due to this being a TeleHealth encounter (During [facility]- public health emergency), evaluation of the following organ systems was limited: Vitals/Constitutional/EENT/Resp/CV/GI//MS/Neuro/Skin/Heme-Lymph-Imm.   Pursuant to the emergency declaration under the [employer] and the [employer] Act, [facility] waiver authority and the Coronavirus Preparedness and [employer] Act, this Virtual Visit was conducted with patient's (and/or legal guardian's) consent, to reduce the patient's risk of exposure to COVID-19 and provide necessary medical care. The patient (and/or legal guardian) has also been advised to contact this office for worsening conditions or problems, and seek emergency medical treatment and/or call 911 if deemed necessary. Services were provided through a video synchronous discussion virtually to substitute for in-person clinic visit. Patient and provider were located at their individual homes. 1.   I confirm that this visit was completed in a telehealth setting ,using synchronous audiovisual technology for real time patient interaction . The patient identity with name and date of birth was confirmed . This evaluation of patient was done by telehealth in the setting of [address] emergency , which precluded assurance of safe in person visit at the time of service. The patient consented to and accepts potential risks associated with telemedical evaluation and care was taken to assess sai presence of any medical issues that would be more  appropriate for expedited in -person care. Pursuant to the emergency declaration under the [facility] waiver authority and the [employer] and [employer] Act, this Virtual  Visit was conducted, with patient's consent, to reduce the patient's risk of exposure to COVID-19 and provide continuity of care for an established patient. Services were provided through a video synchronous discussion virtually to substitute for in-person clinic visit.    2. I Affirm this is a Patient Initiated Episode with an Established Patient who has not had a related appointment within my department in the past 7 days or scheduled within the next 24 hours. --Clarence Bunch MD on 10/5/2020 at 9:29 AM    An electronic signature was used to authenticate this note.

## 2020-10-08 ENCOUNTER — TELEPHONE (OUTPATIENT)
Dept: CARDIOLOGY CLINIC | Age: 60
End: 2020-10-08

## 2020-11-09 ENCOUNTER — TELEPHONE (OUTPATIENT)
Dept: CARDIOLOGY CLINIC | Age: 60
End: 2020-11-09

## 2020-11-09 NOTE — TELEPHONE ENCOUNTER
Called the patient to get his testing appointments scheduled that Miracle Mckay has ordered for the patient

## 2020-11-12 RX ORDER — AMLODIPINE BESYLATE 10 MG/1
10 TABLET ORAL DAILY
Qty: 30 TABLET | OUTPATIENT
Start: 2020-11-12

## 2020-11-16 ENCOUNTER — OFFICE VISIT (OUTPATIENT)
Dept: FAMILY MEDICINE CLINIC | Age: 60
End: 2020-11-16
Payer: COMMERCIAL

## 2020-11-16 VITALS
HEART RATE: 60 BPM | HEIGHT: 72 IN | DIASTOLIC BLOOD PRESSURE: 100 MMHG | BODY MASS INDEX: 25.19 KG/M2 | SYSTOLIC BLOOD PRESSURE: 150 MMHG | WEIGHT: 186 LBS | TEMPERATURE: 98.6 F | OXYGEN SATURATION: 98 %

## 2020-11-16 PROCEDURE — 90686 IIV4 VACC NO PRSV 0.5 ML IM: CPT | Performed by: PHYSICIAN ASSISTANT

## 2020-11-16 PROCEDURE — 90471 IMMUNIZATION ADMIN: CPT | Performed by: PHYSICIAN ASSISTANT

## 2020-11-16 PROCEDURE — G0296 VISIT TO DETERM LDCT ELIG: HCPCS | Performed by: PHYSICIAN ASSISTANT

## 2020-11-16 PROCEDURE — 99214 OFFICE O/P EST MOD 30 MIN: CPT | Performed by: PHYSICIAN ASSISTANT

## 2020-11-16 RX ORDER — AMLODIPINE BESYLATE 10 MG/1
10 TABLET ORAL DAILY
Qty: 90 TABLET | Refills: 1 | Status: SHIPPED | OUTPATIENT
Start: 2020-11-16 | End: 2021-04-21

## 2020-11-16 RX ORDER — PRASUGREL 10 MG/1
10 TABLET, FILM COATED ORAL DAILY
Qty: 90 TABLET | Refills: 1 | Status: SHIPPED | OUTPATIENT
Start: 2020-11-16 | End: 2021-04-21

## 2020-11-16 RX ORDER — METFORMIN HYDROCHLORIDE 500 MG/1
500 TABLET, EXTENDED RELEASE ORAL
Qty: 90 TABLET | Refills: 1 | Status: SHIPPED | OUTPATIENT
Start: 2020-11-16 | End: 2020-12-07 | Stop reason: SDUPTHER

## 2020-11-16 RX ORDER — LISINOPRIL 10 MG/1
10 TABLET ORAL DAILY
Qty: 30 TABLET | Refills: 1 | Status: SHIPPED | OUTPATIENT
Start: 2020-11-16 | End: 2020-12-07 | Stop reason: SDUPTHER

## 2020-11-16 ASSESSMENT — ENCOUNTER SYMPTOMS
RHINORRHEA: 0
COUGH: 0
SORE THROAT: 0
SHORTNESS OF BREATH: 0

## 2020-11-16 NOTE — PROGRESS NOTES
Vaccine Information Sheet, \"Influenza - Inactivated\"  given to Gerald Barros, or parent/legal guardian of  Gerald Barros and verbalized understanding. Patient responses:    Have you ever had a reaction to a flu vaccine? No  Do you have any current illness? No  Have you ever had [place] Syndrome? No  Do you have a serious allergy to any of the follow: Neomycin, Polymyxin, Thimerosal, eggs or egg products? No    Flu vaccine given per order. Please see immunization tab. Risks and benefits explained. Current VIS given.

## 2020-11-16 NOTE — PROGRESS NOTES
11/16/2020    Tori Paiz    Chief Complaint   Patient presents with    Follow-up     Routine office visit    Medication Refill    Flu Vaccine     Low dose Flu shot requested today    Hypertension     Patient has been out of his BP meds X 5 days, his medication was denied until seen. HPI  History obtained from the patient . Nayla Hackett is a 56 y.o. male who presents today for 6 month follow-up. HTN - Metoprolol 25 mg BID, amlodipine 10 mg daily. Patient notes that he has been out of these medications for about 5 days. The patient states that he has not been on enalapril 10 mg in a very long time. He states that he just decided to stop taking this himself. He does not have a blood pressure cuff at home. ASCVD - Patient follows up with cardiology. He states that he does not take his atorvastatin because it causes muscle cramps. DM Type 2 - Patient is compliant with Metformin 500 mg daily. Health Maintenance - Hep C screen, lipid screen, A1C, HIV screen, microalbuminuria, Low Dose CT, Shingles     REVIEW OF SYMPTOMS  Review of Systems   Constitutional: Negative for chills and fever. HENT: Negative for rhinorrhea and sore throat. Respiratory: Negative for cough and shortness of breath. Cardiovascular: Positive for palpitations. Negative for chest pain. Pt follows up with cardiology for this. PAST MEDICAL HISTORY  Past Medical History:   Diagnosis Date    Abnormal angiogram 4/15/15    100% left iliac instent restenosis, UnSuccessful PTA of rt iliac artery, PTA @ [facility]    Chest pain     Decreased testosterone level     Diabetes mellitus ([address]     H/O cardiovascular stress test 06/24/2019    Myocardial perfusion scan shows small size, moderate intensity, reversible perfusion defect in anterior wall.  H/O Doppler ultrasound 3/12/15    Arterial doppler.  Abnormal right leg NOLAN suggestive of moderate to severe stenosis in the area of internal iliac artery which appears to be completely stenotic. I would recommend right lower extremity angiography.  H/O heart artery stent 3/17/16    3.0 X 22 MM & 3.0 X 18 MM sharmaine to RCA.    H/O percutaneous left heart catheterization 3/1/15    100% LAD, 90% RCA, 100% R. iliac stenosis    H/O percutaneous left heart catheterization 3/16/15    95% mid & 70% prox RCA    H/O percutaneous left heart catheterization 3/17/16    RCA mid 80%,     H/O unstable angina     History of Doppler ultrasound 2016    Carotid-there is no flow limiting lesions BLT     Hx of myocardial infarction     Hyperlipidemia     Hypertension     Kidney stone     Lower ext. Arterial Doppler 10/26/2016    The  study is normal    [facility]AD [facility] artery [facility] ([facility])     100% rt iliac stenosis    PAOD (peripheral arterial occlusive disease) ([facility])     S/P angioplasty with stent 3/1/15    Successful aspiration thrombectomy and stenting of the proximal LAD    Smoker     Status post percutaneous transluminal coronary angioplasty     STEMI (ST elevation myocardial infarction) ([facility] 2015    Anterior Wall.        FAMILY HISTORY  Family History   Problem Relation Age of Onset    Cancer Father     Diabetes Father     Heart Disease Father     Cancer Mother     Diabetes Mother     Heart Disease Mother        SOCIAL HISTORY  Social History     Socioeconomic History    Marital status:      Spouse name: None    Number of children: None    Years of education: None    Highest education level: None   Occupational History    None   Social Needs    Financial resource strain: None    Food insecurity     Worry: None     Inability: None    Transportation needs     Medical: None     Non-medical: None   Tobacco Use    Smoking status: Current Every Day Smoker     Packs/day: 1.50     Years: 25.00     Pack years: 37.50     Types: Cigarettes     Last attempt to quit: 3/1/2015     Years since quittin.7    Smokeless tobacco: Never Used   Substance and Sexual Activity    Alcohol use: Yes     Alcohol/week: 0.0 standard drinks     Comment: 2/month    Drug use: No    Sexual activity: Not Currently     Partners: Female   Lifestyle    Physical activity     Days per week: None     Minutes per session: None    Stress: None   Relationships    Social connections     Talks on phone: None     Gets together: None     Attends Muslim service: None     Active member of club or organization: None     Attends meetings of clubs or organizations: None     Relationship status: None    Intimate partner violence     Fear of current or ex partner: None     Emotionally abused: None     Physically abused: None     Forced sexual activity: None   Other Topics Concern    None   Social History Narrative    None        SURGICAL HISTORY  Past Surgical History:   Procedure Laterality Date    CARDIAC CATHETERIZATION  3/1/15    100% LAD, 90% RCA, 100% R. iliac stenosis    CARDIAC CATHETERIZATION  3/16/15    95% mid & 70% prox RCA    CARDIAC CATHETERIZATION  3/17/16    RCA mid 80%,     CORONARY ANGIOPLASTY WITH STENT PLACEMENT  3/1/15    Successful aspiration thrombectomy and stenting of the proximal LAD    CORONARY ANGIOPLASTY WITH STENT PLACEMENT  3/16/15    2.75 X 12 MM, 3.00 X 30 MM 2.75 X 8 MM rca     CORONARY ANGIOPLASTY WITH STENT PLACEMENT  3/17/16    3.0 X 22 MM & 3.0 X 18 MM sharmaine to RCA.     TONSILLECTOMY         CURRENT MEDICATIONS  Current Outpatient Medications   Medication Sig Dispense Refill    metFORMIN (GLUCOPHAGE-XR) 500 MG extended release tablet Take 1 tablet by mouth daily (with breakfast) 90 tablet 1    metoprolol tartrate (LOPRESSOR) 25 MG tablet Take 1 tablet by mouth 2 times daily 180 tablet 1    prasugrel (EFFIENT) 10 MG TABS Take 1 tablet by mouth daily 90 tablet 1    amLODIPine (NORVASC) 10 MG tablet Take 1 tablet by mouth daily 90 tablet 1    lisinopril (PRINIVIL;ZESTRIL) 10 MG tablet Take 1 tablet by mouth daily 30 tablet 1    aspirin 81 MG EC tablet Take 1 tablet by mouth daily. 30 tablet 3    blood glucose test strips (ACCU-CHEK GUIDE) strip 1 each by In Vitro route daily As needed.  Lancets Misc. (ACCU-CHEK MULTICLIX LANCET DEV) KIT by Does not apply route      TOUJEO SOLOSTAR 300 UNIT/ML injection pen INJECT 10 UNITS SUB Q EVERY EVENING  2     No current facility-administered medications for this visit. ALLERGIES  No Known Allergies    RECENT LABS    Lab Results   Component Value Date    LABA1C 8.7 (H) 07/27/2016     Lab Results   Component Value Date     07/27/2016       Lab Results   Component Value Date    CHOL 217 (H) 07/27/2016    CHOL 118 04/22/2016     Lab Results   Component Value Date    LDLCHOLESTEROL 69 04/22/2016       Lab Results   Component Value Date    WBC 10.9 (H) 07/11/2019    HGB 15.2 07/11/2019    HCT 45.6 07/11/2019    MCV 88.5 07/11/2019     07/11/2019       PHYSICAL EXAM  BP (!) 150/100 (Site: Right Upper Arm, Position: Sitting, Cuff Size: Medium Adult)   Pulse 60   Temp 98.6 °F (37 °C) (Temporal)   Ht 6' (1.829 m)   Wt 186 lb (84.4 kg)   SpO2 98%   BMI 25.23 kg/m²     Physical Exam  Constitutional:       Appearance: Normal appearance. HENT:      Head: Normocephalic and atraumatic. Eyes:      Comments: EOM grossly intact. Cardiovascular:      Rate and Rhythm: Normal rate and regular rhythm. Heart sounds: No murmur. No friction rub. No gallop. Comments: No carotid bruits. Examined the patient's feet, which are warm to the touch, brisk capillary refill, excellent pulses bilaterally. Pulmonary:      Effort: Pulmonary effort is normal.      Breath sounds: Normal breath sounds. No wheezing, rhonchi or rales. Skin:     General: Skin is warm and dry. Neurological:      Mental Status: He is alert and oriented to person, place, and time.       Comments: Cranial nerves II-XII grossly intact   Psychiatric:         Mood and Affect: Mood normal.         Behavior: Behavior normal. ASSESSMENT & PLAN  1. Type 2 diabetes mellitus without complication, with long-term current use of insulin (HCC)  Need to check A1C. The patient states that his new insurance starts January 1, 2021, so he is going to get his fasting labs done in early January. Compliant with Metformin. Refilled medication.   - metFORMIN (GLUCOPHAGE-XR) 500 MG extended release tablet; Take 1 tablet by mouth daily (with breakfast)  Dispense: 90 tablet; Refill: 1  - Comprehensive Metabolic Panel; Future  - CBC Auto Differential; Future  - Hemoglobin A1C; Future  - Microalbumin / Creatinine Urine Ratio; Future    2. Essential hypertension  Not controlled. Patient stopped taking his ACE inhibitor. 150/100 in the office today. Sent prescription for Lisinopril 10 mg daily and refilled other prescriptions. I explained the importance of controlling blood pressure to reduce the risk of stroke and heart attack. Discussed the goal blood pressure is less than 358 systolic less than 80 diastolic. I instructed the patient to obtain a home blood pressure cuff, preferably an arm cuff. Recommended the brand Omron. Instructed the patient to take his blood pressure once daily for the next 2 weeks, and I will follow up with the patient in 3 weeks to see how he is doing.   - metoprolol tartrate (LOPRESSOR) 25 MG tablet; Take 1 tablet by mouth 2 times daily  Dispense: 180 tablet; Refill: 1  - amLODIPine (NORVASC) 10 MG tablet; Take 1 tablet by mouth daily  Dispense: 90 tablet; Refill: 1  - lisinopril (PRINIVIL;ZESTRIL) 10 MG tablet; Take 1 tablet by mouth daily  Dispense: 30 tablet; Refill: 1    3. Need for influenza vaccination  Pato ROBBINS, administered this in the office today. - INFLUENZA, QUADV, 3 YRS AND OLDER, IM PF, PREFILL SYR OR SDV, 0.5ML (AFLURIA QUADV, PF)    4. Lipid screening  Health maintenance requirement. - Lipid Panel; Future    5.  Screening for human immunodeficiency virus  Health maintenance requirement.  - HIV Screen;

## 2020-11-17 ENCOUNTER — TELEPHONE (OUTPATIENT)
Dept: CARDIOLOGY CLINIC | Age: 60
End: 2020-11-17

## 2020-11-17 NOTE — TELEPHONE ENCOUNTER
Pt called and states he will call to reschedule testing after the first of the year. Due to he will new and better ins. have better ins.

## 2020-12-07 ENCOUNTER — OFFICE VISIT (OUTPATIENT)
Dept: FAMILY MEDICINE CLINIC | Age: 60
End: 2020-12-07
Payer: COMMERCIAL

## 2020-12-07 VITALS
OXYGEN SATURATION: 96 % | TEMPERATURE: 97.8 F | DIASTOLIC BLOOD PRESSURE: 78 MMHG | BODY MASS INDEX: 25.38 KG/M2 | HEART RATE: 66 BPM | HEIGHT: 72 IN | WEIGHT: 187.4 LBS | SYSTOLIC BLOOD PRESSURE: 136 MMHG

## 2020-12-07 PROCEDURE — 99214 OFFICE O/P EST MOD 30 MIN: CPT | Performed by: PHYSICIAN ASSISTANT

## 2020-12-07 RX ORDER — LISINOPRIL 20 MG/1
20 TABLET ORAL DAILY
Qty: 30 TABLET | Refills: 1 | Status: SHIPPED | OUTPATIENT
Start: 2020-12-07 | End: 2021-03-25 | Stop reason: SDUPTHER

## 2020-12-07 RX ORDER — METFORMIN HYDROCHLORIDE EXTENDED-RELEASE TABLETS 1000 MG/1
1000 TABLET, FILM COATED, EXTENDED RELEASE ORAL
Qty: 30 TABLET | Refills: 1 | Status: SHIPPED | OUTPATIENT
Start: 2020-12-07 | End: 2021-02-15 | Stop reason: SDUPTHER

## 2020-12-07 ASSESSMENT — ENCOUNTER SYMPTOMS
VOMITING: 0
COUGH: 0
SHORTNESS OF BREATH: 0
NAUSEA: 0

## 2020-12-07 NOTE — PROGRESS NOTES
2020    [facility]    Chief Complaint   Patient presents with    1 Month Follow-Up     3 wk f/u ,  pt brought in log of BP's       HPI  History obtained from the patient. Maurice Perez is a 56 y.o. male who presents today for 3 week follow up on blood pressure. HTN - Pt compliant with Metoprolol 25 mg BID, Amlodipine 10 mg daily, Lisinopril 10 mg daily. The patient obtained a blood pressure cuff and has been taking his blood pressure once or twice daily at home. I reviewed his blood pressure log, and over the last few days, his home blood pressure has averaged 165/100. Today in the office it was 136/78. The patient admits occasional headaches, which she attributes to working with harsh fumes. He also notes chronic blurry vision but states that he has avoided the eye doctor because he does not want to get glasses. Patient notes occasional chest pain, for which he sees a cardiologist.  He states that this has not gotten worse, and it does not happen regularly. DM - Compliant with Metformin  mg nightly. The patient states that he will not have insurance until January, so he does not want to get his blood work (including A1c) done until then. Fasting blood glucose at home has been averaging high, up to 200 several days in a row. Patient states that he found an old prescription for Toujeo in his refrigerator, which is , but he has started taking 20 units nightly. The patient admits a few episodes of lightheadedness and shakiness before eating. REVIEW OF SYMPTOMS  Review of Systems   Constitutional: Negative for chills and fever. Respiratory: Negative for cough and shortness of breath. Cardiovascular: Positive for chest pain. Patient sees cardiology for this. No recent changes. Gastrointestinal: Negative for nausea and vomiting.        PAST MEDICAL HISTORY  Past Medical History:   Diagnosis Date    Abnormal angiogram 4/15/15    100% left iliac instent restenosis, UnSuccessful PTA of rt iliac artery, PTA @ [facility]    Chest pain     Decreased testosterone level     Diabetes mellitus ([facility] [address]     H/O cardiovascular stress test 06/24/2019    Myocardial perfusion scan shows small size, moderate intensity, reversible perfusion defect in anterior wall.  H/O Doppler ultrasound 3/12/15    Arterial doppler. Abnormal right leg NOLAN suggestive of moderate to severe stenosis in the area of internal iliac artery which appears to be completely stenotic. I would recommend right lower extremity angiography.  H/O heart artery stent 3/17/16    3.0 X 22 MM & 3.0 X 18 MM sharmaine to RCA.    H/O percutaneous left heart catheterization 3/1/15    100% LAD, 90% RCA, 100% R. iliac stenosis    H/O percutaneous left heart catheterization 3/16/15    95% mid & 70% prox RCA    H/O percutaneous left heart catheterization 3/17/16    RCA mid 80%,     H/O unstable angina     History of Doppler ultrasound 04/22/2016    Carotid-there is no flow limiting lesions BLT     Hx of myocardial infarction     Hyperlipidemia     Hypertension     Kidney stone     Lower ext. Arterial Doppler 10/26/2016    The  study is normal    PAD [facility]peripheral artery disease) ([facility])     100% rt iliac stenosis    PAOD (peripheral arterial occlusive disease) ([facility])     S/P angioplasty with stent 3/1/15    Successful aspiration thrombectomy and stenting of the proximal LAD    Smoker     Status post percutaneous transluminal coronary angioplasty     STEMI (ST elevation myocardial infarction) ([facility] [address] 03/01/2015    Anterior Wall.        FAMILY HISTORY  Family History   Problem Relation Age of Onset    Cancer Father     Diabetes Father     Heart Disease Father     Cancer Mother     Diabetes Mother     Heart Disease Mother        SOCIAL HISTORY  Social History     Socioeconomic History    Marital status:      Spouse name: Not on file    Number of children: Not on file    Years of education: Not on file    Highest education level: Not on file   Occupational History    Not on file   Social Needs    Financial resource strain: Not on file    Food insecurity     Worry: Not on file     Inability: Not on file    Transportation needs     Medical: Not on file     Non-medical: Not on file   Tobacco Use    Smoking status: Current Every Day Smoker     Packs/day: 1.50     Years: 25.00     Pack years: 37.50     Types: Cigarettes     Last attempt to quit: 3/1/2015     Years since quittin.7    Smokeless tobacco: Never Used   Substance and Sexual Activity    Alcohol use:  Yes     Alcohol/week: 0.0 standard drinks     Comment: 2/month    Drug use: No    Sexual activity: Not Currently     Partners: Female   Lifestyle    Physical activity     Days per week: Not on file     Minutes per session: Not on file    Stress: Not on file   Relationships    Social connections     Talks on phone: Not on file     Gets together: Not on file     Attends Confucianist service: Not on file     Active member of club or organization: Not on file     Attends meetings of clubs or organizations: Not on file     Relationship status: Not on file    Intimate partner violence     Fear of current or ex partner: Not on file     Emotionally abused: Not on file     Physically abused: Not on file     Forced sexual activity: Not on file   Other Topics Concern    Not on file   Social History Narrative    Not on file        SURGICAL HISTORY  Past Surgical History:   Procedure Laterality Date    CARDIAC CATHETERIZATION  3/1/15    100% LAD, 90% RCA, 100% R. iliac stenosis    CARDIAC CATHETERIZATION  3/16/15    95% mid & 70% prox RCA    CARDIAC CATHETERIZATION  3/17/16    RCA mid 80%,     CORONARY ANGIOPLASTY WITH STENT PLACEMENT  3/1/15    Successful aspiration thrombectomy and stenting of the proximal LAD    CORONARY ANGIOPLASTY WITH STENT PLACEMENT  3/16/15    2.75 X 12 MM, 3.00 X 30 MM 2.75 X 8 MM rca     CORONARY ANGIOPLASTY WITH STENT PLACEMENT  3/17/16 3. 0 X 22 MM & 3.0 X 18 MM sharmaine to RCA.  TONSILLECTOMY         CURRENT MEDICATIONS  Current Outpatient Medications   Medication Sig Dispense Refill    lisinopril (PRINIVIL;ZESTRIL) 20 MG tablet Take 1 tablet by mouth daily 30 tablet 1    metFORMIN (FORTAMET) 1000 MG extended release tablet Take 1 tablet by mouth daily (with breakfast) 30 tablet 1    metoprolol tartrate (LOPRESSOR) 25 MG tablet Take 1 tablet by mouth 2 times daily 180 tablet 1    prasugrel (EFFIENT) 10 MG TABS Take 1 tablet by mouth daily 90 tablet 1    amLODIPine (NORVASC) 10 MG tablet Take 1 tablet by mouth daily 90 tablet 1    blood glucose test strips (ACCU-CHEK GUIDE) strip 1 each by In Vitro route daily As needed.  Lancets Misc. (ACCU-CHEK MULTICLIX LANCET DEV) KIT by Does not apply route      aspirin 81 MG EC tablet Take 1 tablet by mouth daily. 30 tablet 3     No current facility-administered medications for this visit. ALLERGIES  No Known Allergies    RECENT LABS    Lab Results   Component Value Date    LABA1C 8.7 (H) 07/27/2016     Lab Results   Component Value Date     07/27/2016       Lab Results   Component Value Date    CHOL 217 (H) 07/27/2016    CHOL 118 04/22/2016     Lab Results   Component Value Date    LDLCHOLESTEROL 69 04/22/2016       Lab Results   Component Value Date    WBC 10.9 (H) 07/11/2019    HGB 15.2 07/11/2019    HCT 45.6 07/11/2019    MCV 88.5 07/11/2019     07/11/2019       PHYSICAL EXAM  /78   Pulse 66   Temp 97.8 °F (36.6 °C)   Ht 6' (1.829 m)   Wt 187 lb 6.4 oz (85 kg)   SpO2 96%   BMI 25.42 kg/m²     Physical Exam  Constitutional:       Appearance: Normal appearance. HENT:      Head: Normocephalic and atraumatic. Eyes:      Comments: EOM grossly intact. Cardiovascular:      Rate and Rhythm: Normal rate and regular rhythm. Heart sounds: No murmur. No friction rub. No gallop.     Pulmonary:      Effort: Pulmonary effort is normal.      Breath sounds: Normal breath sounds. No wheezing, rhonchi or rales. Skin:     General: Skin is warm and dry. Neurological:      Mental Status: He is alert and oriented to person, place, and time. Comments: Cranial nerves II-XII grossly intact   Psychiatric:         Mood and Affect: Mood normal.         Behavior: Behavior normal.         ASSESSMENT & PLAN  1. Essential hypertension  Instructed patient to increase his dose of lisinopril to 20 mg daily. We will follow-up in 2 weeks with a nursing visit to check his blood pressure, and the patient will leave his log of blood pressures for me to review. I instructed the patient to try taking his blood pressure at the pharmacy/grocery store, so that he can compare it to his home readings. I suspect that his home blood pressure monitor may be inaccurate.  - lisinopril (PRINIVIL;ZESTRIL) 20 MG tablet; Take 1 tablet by mouth daily  Dispense: 30 tablet; Refill: 1    2. Type 2 diabetes mellitus without complication, with long-term current use of insulin ([facility])  Instructed the patient to stop taking the  Toujeo. We looked up prices and good Rx coupons for this medication, and it is over $300, which the patient cannot afford at this time without insurance. We increased his dose of Metformin ER from 500 mg nightly up to 1000 mg nightly. I explained that his goal fasting glucose is 130 or less. We will check an A1c as soon as possible (likely in January when the patient has insurance, he does not want to get lab work done before this). - metFORMIN (FORTAMET) 1000 MG extended release tablet; Take 1 tablet by mouth daily (with breakfast)  Dispense: 30 tablet; Refill: 1          Return in about 2 weeks (around 2020).             Electronically signed by Davide Hassan PA-C on 2020

## 2020-12-22 ENCOUNTER — TELEPHONE (OUTPATIENT)
Dept: FAMILY MEDICINE CLINIC | Age: 60
End: 2020-12-22

## 2020-12-22 NOTE — TELEPHONE ENCOUNTER
12/22/2020 Left message to reschedule 12/21/2020 missed nurse appointment.   [facility] [place]

## 2021-01-11 ENCOUNTER — TELEPHONE (OUTPATIENT)
Dept: FAMILY MEDICINE CLINIC | Age: 61
End: 2021-01-11

## 2021-01-11 NOTE — TELEPHONE ENCOUNTER
----- Message from Vladimir Gonzalez PA-C sent at 1/11/2021  8:58 AM EST -----  Regarding: Labs and Chest CT  Please call the patient and remind him to come in for fasting labs at his earliest convenience (he wanted to wait until after the first of the year to do these for insurance purposes). He also needs to call Central Scheduling at [phone] to schedule his lung CT. It is very important to me that he get this done.      Thanks,  August

## 2021-01-29 NOTE — TELEPHONE ENCOUNTER
I'm a little confused why the patient requested this medication. At his last visit, he told me he cannot afford Toujeo, so we took it off his list and increased his dose of Metformin instead. Either way, the patient needs to come in for fasting blood work before we make any more medication changes. He is overdue for A1C.      Thanks,  August

## 2021-02-01 DIAGNOSIS — Z79.4 TYPE 2 DIABETES MELLITUS WITHOUT COMPLICATION, WITH LONG-TERM CURRENT USE OF INSULIN (HCC): Primary | ICD-10-CM

## 2021-02-01 DIAGNOSIS — E11.9 TYPE 2 DIABETES MELLITUS WITHOUT COMPLICATION, WITH LONG-TERM CURRENT USE OF INSULIN (HCC): Primary | ICD-10-CM

## 2021-02-01 RX ORDER — INSULIN GLARGINE 300 U/ML
10 INJECTION, SOLUTION SUBCUTANEOUS NIGHTLY
Qty: 1 PEN | Refills: 0 | Status: SHIPPED | OUTPATIENT
Start: 2021-02-01 | End: 2021-02-15

## 2021-02-01 RX ORDER — INSULIN GLARGINE 300 U/ML
INJECTION, SOLUTION SUBCUTANEOUS
Refills: 2 | OUTPATIENT
Start: 2021-02-01

## 2021-02-01 NOTE — TELEPHONE ENCOUNTER
PT REQUESTS TOUJEO RX, WOULD LIKE TO STAY ON THIS F POSSIBLE.   PT WILL BE IN [facility] [place] TO HAVE A1C DRAWN

## 2021-02-01 NOTE — TELEPHONE ENCOUNTER
I sent this for the patient (I just did orders only instead so that I could enter the dosage).      Thanks,  August

## 2021-02-04 ENCOUNTER — TELEPHONE (OUTPATIENT)
Dept: FAMILY MEDICINE CLINIC | Age: 61
End: 2021-02-04

## 2021-02-04 NOTE — TELEPHONE ENCOUNTER
Medication was sent to the pharmacy and prior auth has been initiated  By Josy Serrano and waiting Ins decision .

## 2021-02-04 NOTE — TELEPHONE ENCOUNTER
Patient called and stated he called [address] and his script for his insulin Toujeo was not there. I called [address] spoke to [place] and the medication has to have a PA.

## 2021-02-10 DIAGNOSIS — E11.9 TYPE 2 DIABETES MELLITUS WITHOUT COMPLICATION, WITH LONG-TERM CURRENT USE OF INSULIN (HCC): ICD-10-CM

## 2021-02-10 DIAGNOSIS — I10 ESSENTIAL HYPERTENSION: ICD-10-CM

## 2021-02-10 DIAGNOSIS — Z79.4 TYPE 2 DIABETES MELLITUS WITHOUT COMPLICATION, WITH LONG-TERM CURRENT USE OF INSULIN (HCC): ICD-10-CM

## 2021-02-10 RX ORDER — METFORMIN HYDROCHLORIDE 500 MG/1
TABLET, EXTENDED RELEASE ORAL
Qty: 60 TABLET | Refills: 1 | OUTPATIENT
Start: 2021-02-10

## 2021-02-10 RX ORDER — AMLODIPINE BESYLATE 10 MG/1
10 TABLET ORAL DAILY
Qty: 90 TABLET | Refills: 1 | OUTPATIENT
Start: 2021-02-10

## 2021-02-11 ENCOUNTER — TELEPHONE (OUTPATIENT)
Dept: FAMILY MEDICINE CLINIC | Age: 61
End: 2021-02-11

## 2021-02-11 NOTE — TELEPHONE ENCOUNTER
Called pt , no answer , left detailed vm with entire message per andrey . vm requesting return call to make appt.

## 2021-02-11 NOTE — TELEPHONE ENCOUNTER
----- Message from Jazz Torre PA-C sent at 2/9/2021  1:54 PM EST -----  Regarding: Prior Auth of Toujeo  Please call the patient and let him know that his insurance denied his Toujeo. He was due to follow-up with me on12/21/2020. Please get him scheduled for an appointment. He MUST come in and get his labs done before this appointment. He is very overdue for hemoglobin A1c and we cannot treat his diabetes until we know what his A1c is. Please bring blood pressure log to this appointment.     Thanks,  August

## 2021-02-12 DIAGNOSIS — Z11.59 NEED FOR HEPATITIS C SCREENING TEST: ICD-10-CM

## 2021-02-12 DIAGNOSIS — E11.9 TYPE 2 DIABETES MELLITUS WITHOUT COMPLICATION, WITH LONG-TERM CURRENT USE OF INSULIN (HCC): ICD-10-CM

## 2021-02-12 DIAGNOSIS — Z79.4 TYPE 2 DIABETES MELLITUS WITHOUT COMPLICATION, WITH LONG-TERM CURRENT USE OF INSULIN (HCC): ICD-10-CM

## 2021-02-12 DIAGNOSIS — Z11.4 SCREENING FOR HUMAN IMMUNODEFICIENCY VIRUS: ICD-10-CM

## 2021-02-12 DIAGNOSIS — Z13.220 LIPID SCREENING: ICD-10-CM

## 2021-02-12 LAB
A/G RATIO: 1.7 (ref 1.1–2.2)
ALBUMIN SERPL-MCNC: 4.5 G/DL (ref 3.4–5)
ALP BLD-CCNC: 88 U/L (ref 40–129)
ALT SERPL-CCNC: 15 U/L (ref 10–40)
ANION GAP SERPL CALCULATED.3IONS-SCNC: 13 MMOL/L (ref 3–16)
AST SERPL-CCNC: 13 U/L (ref 15–37)
BASOPHILS ABSOLUTE: 0 K/UL (ref 0–0.2)
BASOPHILS RELATIVE PERCENT: 0.6 %
BILIRUB SERPL-MCNC: 0.3 MG/DL (ref 0–1)
BUN BLDV-MCNC: 16 MG/DL (ref 7–20)
CALCIUM SERPL-MCNC: 10.2 MG/DL (ref 8.3–10.6)
CHLORIDE BLD-SCNC: 104 MMOL/L (ref 99–110)
CHOLESTEROL, TOTAL: 215 MG/DL (ref 0–199)
CO2: 23 MMOL/L (ref 21–32)
CREAT SERPL-MCNC: 1.1 MG/DL (ref 0.8–1.3)
CREATININE URINE: 136.2 MG/DL (ref 39–259)
EOSINOPHILS ABSOLUTE: 0.2 K/UL (ref 0–0.6)
EOSINOPHILS RELATIVE PERCENT: 2.3 %
GFR AFRICAN AMERICAN: >60
GFR NON-AFRICAN AMERICAN: >60
GLOBULIN: 2.7 G/DL
GLUCOSE BLD-MCNC: 189 MG/DL (ref 70–99)
HCT VFR BLD CALC: 45.5 % (ref 40.5–52.5)
HDLC SERPL-MCNC: 40 MG/DL (ref 40–60)
HEMOGLOBIN: 15.1 G/DL (ref 13.5–17.5)
HEPATITIS C ANTIBODY INTERPRETATION: NORMAL
LDL CHOLESTEROL CALCULATED: 141 MG/DL
LYMPHOCYTES ABSOLUTE: 2 K/UL (ref 1–5.1)
LYMPHOCYTES RELATIVE PERCENT: 22.8 %
MCH RBC QN AUTO: 29.8 PG (ref 26–34)
MCHC RBC AUTO-ENTMCNC: 33.2 G/DL (ref 31–36)
MCV RBC AUTO: 89.7 FL (ref 80–100)
MICROALBUMIN UR-MCNC: 6.1 MG/DL
MICROALBUMIN/CREAT UR-RTO: 44.8 MG/G (ref 0–30)
MONOCYTES ABSOLUTE: 0.6 K/UL (ref 0–1.3)
MONOCYTES RELATIVE PERCENT: 7.3 %
NEUTROPHILS ABSOLUTE: 5.9 K/UL (ref 1.7–7.7)
NEUTROPHILS RELATIVE PERCENT: 67 %
PDW BLD-RTO: 13.9 % (ref 12.4–15.4)
PLATELET # BLD: 286 K/UL (ref 135–450)
PMV BLD AUTO: 9.2 FL (ref 5–10.5)
POTASSIUM SERPL-SCNC: 4.5 MMOL/L (ref 3.5–5.1)
RBC # BLD: 5.07 M/UL (ref 4.2–5.9)
SODIUM BLD-SCNC: 140 MMOL/L (ref 136–145)
TOTAL PROTEIN: 7.2 G/DL (ref 6.4–8.2)
TRIGL SERPL-MCNC: 172 MG/DL (ref 0–150)
VLDLC SERPL CALC-MCNC: 34 MG/DL
WBC # BLD: 8.9 K/UL (ref 4–11)

## 2021-02-13 LAB
ESTIMATED AVERAGE GLUCOSE: 211.6 MG/DL
HBA1C MFR BLD: 9 %
HIV AG/AB: NORMAL
HIV ANTIGEN: NORMAL
HIV-1 ANTIBODY: NORMAL
HIV-2 AB: NORMAL

## 2021-02-15 ENCOUNTER — TELEPHONE (OUTPATIENT)
Dept: FAMILY MEDICINE CLINIC | Age: 61
End: 2021-02-15

## 2021-02-15 DIAGNOSIS — Z79.4 TYPE 2 DIABETES MELLITUS WITHOUT COMPLICATION, WITH LONG-TERM CURRENT USE OF INSULIN (HCC): ICD-10-CM

## 2021-02-15 DIAGNOSIS — E11.9 TYPE 2 DIABETES MELLITUS WITHOUT COMPLICATION, WITH LONG-TERM CURRENT USE OF INSULIN (HCC): ICD-10-CM

## 2021-02-15 DIAGNOSIS — E78.2 MIXED HYPERLIPIDEMIA: Primary | ICD-10-CM

## 2021-02-15 RX ORDER — ROSUVASTATIN CALCIUM 10 MG/1
10 TABLET, COATED ORAL NIGHTLY
Qty: 90 TABLET | Refills: 1 | Status: SHIPPED | OUTPATIENT
Start: 2021-02-15 | End: 2021-06-09 | Stop reason: DRUGHIGH

## 2021-02-15 RX ORDER — METFORMIN HYDROCHLORIDE EXTENDED-RELEASE TABLETS 1000 MG/1
2000 TABLET, FILM COATED, EXTENDED RELEASE ORAL
Qty: 180 TABLET | Refills: 1 | Status: SHIPPED | OUTPATIENT
Start: 2021-02-15 | End: 2021-08-04

## 2021-03-25 DIAGNOSIS — I10 ESSENTIAL HYPERTENSION: ICD-10-CM

## 2021-03-25 RX ORDER — LISINOPRIL 20 MG/1
20 TABLET ORAL DAILY
Qty: 30 TABLET | Refills: 1 | Status: SHIPPED | OUTPATIENT
Start: 2021-03-25 | End: 2021-05-18 | Stop reason: SDUPTHER

## 2021-04-07 RX ORDER — PEN NEEDLE, DIABETIC 32GX 5/32"
NEEDLE, DISPOSABLE MISCELLANEOUS
Qty: 100 EACH | Refills: 10 | Status: SHIPPED | OUTPATIENT
Start: 2021-04-07

## 2021-04-07 NOTE — TELEPHONE ENCOUNTER
Pharmacy called and I spoke with pharmacy staff, pt was at pharmacy and is completely out of pen needles. A request was sent but it was denied by our office. I gave a verbal for them to fill now since the pt was there. Can you please send these since Evonne Weir is out of the office today? Thank you.

## 2021-04-21 DIAGNOSIS — I10 ESSENTIAL HYPERTENSION: ICD-10-CM

## 2021-04-21 RX ORDER — AMLODIPINE BESYLATE 10 MG/1
10 TABLET ORAL DAILY
Qty: 30 TABLET | Refills: 0 | Status: SHIPPED | OUTPATIENT
Start: 2021-04-21 | End: 2021-05-18 | Stop reason: SDUPTHER

## 2021-04-21 RX ORDER — PRASUGREL 10 MG/1
10 TABLET, FILM COATED ORAL DAILY
Qty: 30 TABLET | Refills: 0 | Status: SHIPPED | OUTPATIENT
Start: 2021-04-21 | End: 2021-09-09 | Stop reason: SDUPTHER

## 2021-05-17 DIAGNOSIS — I10 ESSENTIAL HYPERTENSION: ICD-10-CM

## 2021-05-18 RX ORDER — LISINOPRIL 20 MG/1
20 TABLET ORAL DAILY
Qty: 30 TABLET | Refills: 0 | Status: SHIPPED | OUTPATIENT
Start: 2021-05-18 | End: 2021-07-07 | Stop reason: SDUPTHER

## 2021-05-18 RX ORDER — AMLODIPINE BESYLATE 10 MG/1
10 TABLET ORAL DAILY
Qty: 30 TABLET | Refills: 0 | Status: SHIPPED | OUTPATIENT
Start: 2021-05-18 | End: 2021-07-07 | Stop reason: SDUPTHER

## 2021-05-24 ENCOUNTER — OFFICE VISIT (OUTPATIENT)
Dept: FAMILY MEDICINE CLINIC | Age: 61
End: 2021-05-24
Payer: COMMERCIAL

## 2021-05-24 VITALS
SYSTOLIC BLOOD PRESSURE: 128 MMHG | DIASTOLIC BLOOD PRESSURE: 84 MMHG | HEIGHT: 72 IN | OXYGEN SATURATION: 95 % | BODY MASS INDEX: 25.73 KG/M2 | WEIGHT: 190 LBS | HEART RATE: 55 BPM

## 2021-05-24 DIAGNOSIS — E78.2 MIXED HYPERLIPIDEMIA: ICD-10-CM

## 2021-05-24 DIAGNOSIS — I73.9 PAD (PERIPHERAL ARTERY DISEASE) (HCC): ICD-10-CM

## 2021-05-24 DIAGNOSIS — E11.65 UNCONTROLLED TYPE 2 DIABETES MELLITUS WITH HYPERGLYCEMIA (HCC): Primary | ICD-10-CM

## 2021-05-24 DIAGNOSIS — Z12.2 ENCOUNTER FOR SCREENING FOR LUNG CANCER: ICD-10-CM

## 2021-05-24 DIAGNOSIS — I10 ESSENTIAL HYPERTENSION: ICD-10-CM

## 2021-05-24 PROCEDURE — G8427 DOCREV CUR MEDS BY ELIG CLIN: HCPCS | Performed by: PHYSICIAN ASSISTANT

## 2021-05-24 PROCEDURE — 2022F DILAT RTA XM EVC RTNOPTHY: CPT | Performed by: PHYSICIAN ASSISTANT

## 2021-05-24 PROCEDURE — 4004F PT TOBACCO SCREEN RCVD TLK: CPT | Performed by: PHYSICIAN ASSISTANT

## 2021-05-24 PROCEDURE — 3052F HG A1C>EQUAL 8.0%<EQUAL 9.0%: CPT | Performed by: PHYSICIAN ASSISTANT

## 2021-05-24 PROCEDURE — G8419 CALC BMI OUT NRM PARAM NOF/U: HCPCS | Performed by: PHYSICIAN ASSISTANT

## 2021-05-24 PROCEDURE — 3017F COLORECTAL CA SCREEN DOC REV: CPT | Performed by: PHYSICIAN ASSISTANT

## 2021-05-24 PROCEDURE — 99214 OFFICE O/P EST MOD 30 MIN: CPT | Performed by: PHYSICIAN ASSISTANT

## 2021-05-24 RX ORDER — DULAGLUTIDE 1.5 MG/.5ML
1.5 INJECTION, SOLUTION SUBCUTANEOUS WEEKLY
Qty: 12 PEN | Refills: 1 | Status: SHIPPED | OUTPATIENT
Start: 2021-05-24 | End: 2021-07-07 | Stop reason: SINTOL

## 2021-05-24 ASSESSMENT — PATIENT HEALTH QUESTIONNAIRE - PHQ9
SUM OF ALL RESPONSES TO PHQ QUESTIONS 1-9: 0

## 2021-05-24 NOTE — PROGRESS NOTES
stent 3/17/16    3.0 X 22 MM & 3.0 X 18 MM sharmaine to RCA.    H/O percutaneous left heart catheterization 3/1/15    100% LAD, 90% RCA, 100% R. iliac stenosis    H/O percutaneous left heart catheterization 3/16/15    95% mid & 70% prox RCA    H/O percutaneous left heart catheterization 3/17/16    RCA mid 80%,     H/O unstable angina     History of Doppler ultrasound 2016    Carotid-there is no flow limiting lesions BLT     Hx of myocardial infarction     Hyperlipidemia     Hypertension     Kidney stone     Lower ext. Arterial Doppler 10/26/2016    The  study is normal    PAD [facility]peripheral artery disease) ([facility]     100% rt iliac stenosis    PAOD (peripheral arterial occlusive disease) ([facility])     S/P angioplasty with stent 3/1/15    Successful aspiration thrombectomy and stenting of the proximal LAD    Smoker     Status post percutaneous transluminal coronary angioplasty     STEMI (ST elevation myocardial infarction) ([facility] [address] 2015    Anterior Wall. FAMILY HISTORY  Family History   Problem Relation Age of Onset    Cancer Father     Diabetes Father     Heart Disease Father     Cancer Mother     Diabetes Mother     Heart Disease Mother        SOCIAL HISTORY  Social History     Socioeconomic History    Marital status:      Spouse name: Not on file    Number of children: Not on file    Years of education: Not on file    Highest education level: Not on file   Occupational History    Not on file   Tobacco Use    Smoking status: Current Every Day Smoker     Packs/day: 1.50     Years: 25.00     Pack years: 37.50     Types: Cigarettes     Last attempt to quit: 3/1/2015     Years since quittin.2    Smokeless tobacco: Never Used   Vaping Use    Vaping Use: Never used   Substance and Sexual Activity    Alcohol use:  Yes     Alcohol/week: 0.0 standard drinks     Comment: 2/month    Drug use: No    Sexual activity: Not Currently     Partners: Female   Other Topics Concern    Not on file   Social History Narrative    Not on file     Social Determinants of Health     Financial Resource Strain:     Difficulty of Paying Living Expenses:    Food Insecurity:     Worried About Running Out of Food in the Last Year:     [address] in the Last Year:    Transportation Needs:     Lack of Transportation (Medical):  Lack of Transportation (Non-Medical):    Physical Activity:     Days of Exercise per Week:     Minutes of Exercise per Session:    Stress:     Feeling of Stress :    Social Connections:     Frequency of Communication with Friends and Family:     Frequency of Social Gatherings with Friends and Family:     Attends Taoism Services:     Active Member of Clubs or Organizations:     Attends Club or Organization Meetings:     Marital Status:    Intimate Partner Violence:     Fear of Current or Ex-Partner:     Emotionally Abused:     Physically Abused:     Sexually Abused:         SURGICAL HISTORY  Past Surgical History:   Procedure Laterality Date    CARDIAC CATHETERIZATION  3/1/15    100% LAD, 90% RCA, 100% R. iliac stenosis    CARDIAC CATHETERIZATION  3/16/15    95% mid & 70% prox RCA    CARDIAC CATHETERIZATION  3/17/16    RCA mid 80%,     CORONARY ANGIOPLASTY WITH STENT PLACEMENT  3/1/15    Successful aspiration thrombectomy and stenting of the proximal LAD    CORONARY ANGIOPLASTY WITH STENT PLACEMENT  3/16/15    2.75 X 12 MM, 3.00 X 30 MM 2.75 X 8 MM rca     CORONARY ANGIOPLASTY WITH STENT PLACEMENT  3/17/16    3.0 X 22 MM & 3.0 X 18 MM sharmaine to RCA.     TONSILLECTOMY         CURRENT MEDICATIONS  Current Outpatient Medications   Medication Sig Dispense Refill    Dulaglutide (TRULICITY) 1.5 PK/9.4EK SOPN Inject 1.5 mg into the skin once a week 12 pen 1    lisinopril (PRINIVIL;ZESTRIL) 20 MG tablet Take 1 tablet by mouth daily 30 tablet 0    amLODIPine (NORVASC) 10 MG tablet Take 1 tablet by mouth daily 30 tablet 0    prasugrel (EFFIENT) 10 MG TABS TAKE 1 TABLET

## 2021-06-08 DIAGNOSIS — E78.2 MIXED HYPERLIPIDEMIA: ICD-10-CM

## 2021-06-08 DIAGNOSIS — E11.65 UNCONTROLLED TYPE 2 DIABETES MELLITUS WITH HYPERGLYCEMIA (HCC): ICD-10-CM

## 2021-06-08 LAB
CHOLESTEROL, TOTAL: 239 MG/DL (ref 0–199)
HDLC SERPL-MCNC: 35 MG/DL (ref 40–60)
LDL CHOLESTEROL CALCULATED: 163 MG/DL
TRIGL SERPL-MCNC: 205 MG/DL (ref 0–150)
VLDLC SERPL CALC-MCNC: 41 MG/DL

## 2021-06-09 DIAGNOSIS — E78.2 MIXED HYPERLIPIDEMIA: ICD-10-CM

## 2021-06-09 DIAGNOSIS — E11.65 UNCONTROLLED TYPE 2 DIABETES MELLITUS WITH HYPERGLYCEMIA (HCC): Primary | ICD-10-CM

## 2021-06-09 LAB
ESTIMATED AVERAGE GLUCOSE: 260.4 MG/DL
HBA1C MFR BLD: 10.7 %

## 2021-06-09 RX ORDER — ROSUVASTATIN CALCIUM 10 MG/1
20 TABLET, COATED ORAL NIGHTLY
Qty: 90 TABLET | Refills: 1 | Status: SHIPPED
Start: 2021-06-09 | End: 2021-12-06

## 2021-06-30 ENCOUNTER — OFFICE VISIT (OUTPATIENT)
Dept: FAMILY MEDICINE CLINIC | Age: 61
End: 2021-06-30

## 2021-06-30 DIAGNOSIS — E11.9 TYPE 2 DIABETES MELLITUS WITHOUT COMPLICATION, WITH LONG-TERM CURRENT USE OF INSULIN (HCC): Primary | ICD-10-CM

## 2021-06-30 DIAGNOSIS — Z79.4 TYPE 2 DIABETES MELLITUS WITHOUT COMPLICATION, WITH LONG-TERM CURRENT USE OF INSULIN (HCC): Primary | ICD-10-CM

## 2021-06-30 NOTE — PROGRESS NOTES
GOOD [facility]  Diabetes Self-Management Education & Support Program    Reason for Referral: uncontrolled DM2  Referral Source: [place], [place]  Services requested: Individual & group SELF MGT & DIET education only    ASSESSMENT    From my perspective, the participant would benefit from [facility] SYSTEM specifically related to Disease Process, Healthy Eating, Being Active, Monitoring, Taking Medications, Healthy Coping, Reducing Risks and Problem Solving. Will adapt DSMES program to build on participant's current knowledge as noted in the Diabetes Skills, Confidence, and Preparedness Index. During the program, we will focus on providing DSMES that specifically addresses participant's interest in Healthy Eating, Taking Medications, Reducing Risks and Problem Solving, as shown by their reported readiness to change. The participant would be best served by attending individual sessions. Diabetes Self-Management Education Follow-up Visit: 7/15/2021       Clinical Presentation  Darlyn Joshua is a 59 y.o.  male referred for diabetes self-management education. Participant has Type 2 uncontrolled for 4 years. Family history positive for diabetes.  Patient reports DSMES services was not received in the past. Most recent A1c value:   Lab Results   Component Value Date    LABA1C 10.7 06/08/2021       Diabetes-related medical history:  Acute complications  n/a  Neurological complications  without complications  Microvascular disease  without complications  Macrovascular disease  Coronary artery disease and Peripheral arterial disease  Other associated conditions     HTN, HLD    Diabetes-related medications:  Current dosing: Trulicity 3.9JL-[id number] not tolerating this medication-D/C    Metformin 1000mg BID    Blood Pressure Management  Metoprolol 25mg BID  Lisinopril 20mg QD  Amlodipine 10mg QD    Lipid Management  Rosuvastatin 20mg QHS    Clot Prevention  Aspirin 81mg QD    Learning Assessment  Learning objectives Educator assessment (6/30/2021)   Diabetes Disease Process  The participant can   A) describe diabetes in basic terms;   B) state the type of diabetes they have; &   C) state accepted blood glucose targets. Healthy Eating  The participant can   A) identify carbohydrate foods; &   B) accurately read food labels. Being Active  The participant can  A) state the benefits of physical activity;  B) report their current PA practices;  C) identify PA they would consider incorporating in their lives; &  D) develop an implementation plan. Monitoring  The participant can  A) operate their blood glucose meter; &  B) describe how they log their blood glucoses to share with their provider. Taking Medications  The participant can  A) name their diabetes medications;  B) state the purpose and dose;  C) note side effects; &  D) describe proper storage, disposal & transport (if appropriate). Healthy Coping  The participant can    A) describe their response to diabetes diagnosis; B) describe their specific coping mechanisms;  C) identify supportive people and/or other resources that positively support their diabetes self-care and health. Reducing Risks  The participant can describe the preventive measures used by providers to promote health and prevent diabetes complications. Problem Solving  The participant can   A) identify signs, symptoms & treatment of hypoglycemia;   B) identify signs, symptoms & treatment of hyperglycemia;  C) describe their sick day plan; &  D) identify BG patterns to discuss with their provider.        Yes  Yes  Yes        No  No        No  Yes  No  No        Yes  No        Yes  No  Yes  No        Yes  No  No        No          Yes  No  No  No     Characteristics to Learning   Barriers to Learning   [] Cognitive loss  [] Mental retardation       [] Psychiatric disorder  [] Visually impaired  [] Hearing loss                 [] Low literacy  [x] Low health literacy  [] Language  [] Functional limitation  [] Pain   [] Financial  [] Transportation  [] None   Favorite Ways to Learn   [x] Lecture  [x] Slides-handouts  [] Reading [] Video-Internet  [] Cassettes/CDs/MP3's  [] Interactive Small Groups [] Other       Behavioral Assessment  Current self-care practices  Educator assessment (6/30/2021)   Healthy Eating  Current practices  24-hour Dietary Recall:  Breakfast: carmen, eggs, whole wheat toast  Lunch: ham salad sandwich from [employer]: YAMILKA [employer] Beef Broccoli  Snacks: chips  Beverages: gatorade zero  Alcohol: no     Would benefit from DSMES related to Healthy Eating: Yes      Eats a carbohydrate controlled diet: No      Stage of change: contemplation - ambivalent about change. Being Active  Current practices  How many days during the past week have you performed physical activity where your heart beats faster and your breathing is harder than normal for 30 minutes or more?  0    How many days in a typical week do you perform activity such as this? 0     Would benefit from DSMES related to Being Active: Yes      Exercises 150 minutes/week: No      Stage of change: contemplation - ambivalent about change. Pt has a physically active job in [address] [place] [address]. Monitoring  Current practices  Do you monitor your blood sugar? Yes    How often do you monitor? Before meals and 2 hours post meal    What are the range of readings?  mg/dL  Breakfast:  mg/dL  Lunch:  mg/dL  Dinner:  mg/dL  Bedtime: 108-187 mg/dL    Do you know your last A1c measurement? Yes    Do you know the meaning of the A1c? No     Would benefit from [facility] related to Monitoring: Yes      Uses BG readings to establish trends and understand BG patterns: No      Stage of change: maintenance - has made change and is trying and/or practicing different alternative behaviors. Taking Medication  Current practices  Do you understand what your diabetes medications do? No    How often do you miss doses of your diabetes medications? Never    Can you afford your diabetes medications? Yes   Would benefit from [facility] related to Taking Medication: Yes      Takes medications consistently to receive full benefit: Yes      Stage of change: action - ready to set action plan and implement. Pt is not tolerating GLP1, will need to explore alternative treatment options. Healthy Coping   Current state  Diabetes Skills, Confidence and Preparedness Index: Total score: 5.0  Skills: 3.9  Confidence: 5.7  Preparedness: 5.7   Would benefit from DSMES related to Healthy Coping: Yes      Identifies specific people, organizations,etc, that actively support their diabetes self-care efforts:   No      Stage of change: action - ready to set action plan and implement. Reducing Risks  Current state  Vaccines:  Influenza: 11/16/2020    Pneumococcal: 01/26/2018    Hepatitis: No results found for: HAV, HEPAIGM, HEPBIGM, HEPBCAB, HBEAG, HEPCAB    COVID-19: 3/4/2021    Examinations:  Eye Exam: patient to schedule    Dental exam: patient to schedule    Foot exam: to be completed at next PCP appt    Heart Protection:  BP Readings from Last 3 Encounters:   05/24/21 128/84   12/07/20 136/78   11/16/20 (!) 150/100         LDL Calculated (mg/dL)   Date Value   06/08/2021 163 (H)     LDL Direct (MG/DL)   Date Value   07/27/2016 147 (H)     Triglycerides (mg/dL)   Date Value   06/08/2021 205 (H)     HDL (mg/dL)   Date Value   06/08/2021 35 (L)     Cholesterol, Total (mg/dL)   Date Value   06/08/2021 239 (H)         Kidney Protection:  Microalbumin, Random Urine (mg/dL)   Date Value   02/12/2021 6.10 (H)          Would benefit from [facility] related to Reducing Risks: Yes      Actively participates in decision-making with provider regarding secondary prevention:  Yes        Stage of change: action - ready to set action plan and implement.    Problem Solving  Current state  Hypoglycemia Management:  What are signs and symptoms of hypoglycemia that you experience?   dizzy and nausea    How do you prevent hypoglycemia? Take medications as directed, Eat regularly and Pt is educated on prevention measures today    How do you treat hypoglycemia? 1/2 cup juice, 1/2 cup soda, 3-4 glucose tabs, glucose gel, hard candy and Pt is educated on treatment suggestions today    Hyperglycemia Management:  What are signs and symptoms of hyperglycemia that you experience? Patient is unable to identify s/s, excessive thirst, excessive urination, very hungry, sleepy/tired, vision changes, recurrent infections, slow to heal and Pt is educated on these symptoms today    How can you prevent hyperglycemia? Avoid skipping/missing doses of medications, Avoid eating more than usual, Avoid eating more carbs/concentrated sweets, Be more physically active, Use a sick day plan, Manage stress and Pt is educated on prevention measures today    Sick Day Management:  What do you do differently on sick days? Pt does not have a sick day plan currently    Pattern Management:  Do you notice blood glucose patterns when you look at the readings in your meter or logbook? Yes    How do you use the blood glucose readings from your meter or logbook? Pt is unable to interpret patterns     Would benefit from [facility] related to Problem Solving: Yes      Articulates appropriate strategies to address hypoglycemia, hyperglycemia, sick day care and BG pattern: No    Stage of change: action - ready to set action plan and implement.    Note: Content derived from the American Association of Diabetes Educators' Diabetes Education Curriculum: A Guide to Successful Self-Management (2nd edition)      María Jama RN on 6/30/2021 at 10:06 AM    Time in appointment: 60 minutes

## 2021-07-07 ENCOUNTER — OFFICE VISIT (OUTPATIENT)
Dept: FAMILY MEDICINE CLINIC | Age: 61
End: 2021-07-07
Payer: COMMERCIAL

## 2021-07-07 VITALS
WEIGHT: 187.9 LBS | DIASTOLIC BLOOD PRESSURE: 80 MMHG | OXYGEN SATURATION: 98 % | HEIGHT: 72 IN | SYSTOLIC BLOOD PRESSURE: 122 MMHG | BODY MASS INDEX: 25.45 KG/M2 | HEART RATE: 63 BPM

## 2021-07-07 DIAGNOSIS — I10 ESSENTIAL HYPERTENSION: ICD-10-CM

## 2021-07-07 DIAGNOSIS — E11.65 UNCONTROLLED TYPE 2 DIABETES MELLITUS WITH HYPERGLYCEMIA (HCC): Primary | ICD-10-CM

## 2021-07-07 PROCEDURE — 3046F HEMOGLOBIN A1C LEVEL >9.0%: CPT | Performed by: PHYSICIAN ASSISTANT

## 2021-07-07 PROCEDURE — G8427 DOCREV CUR MEDS BY ELIG CLIN: HCPCS | Performed by: PHYSICIAN ASSISTANT

## 2021-07-07 PROCEDURE — 3017F COLORECTAL CA SCREEN DOC REV: CPT | Performed by: PHYSICIAN ASSISTANT

## 2021-07-07 PROCEDURE — 4004F PT TOBACCO SCREEN RCVD TLK: CPT | Performed by: PHYSICIAN ASSISTANT

## 2021-07-07 PROCEDURE — 99214 OFFICE O/P EST MOD 30 MIN: CPT | Performed by: PHYSICIAN ASSISTANT

## 2021-07-07 PROCEDURE — 2022F DILAT RTA XM EVC RTNOPTHY: CPT | Performed by: PHYSICIAN ASSISTANT

## 2021-07-07 PROCEDURE — G8419 CALC BMI OUT NRM PARAM NOF/U: HCPCS | Performed by: PHYSICIAN ASSISTANT

## 2021-07-07 RX ORDER — AMLODIPINE BESYLATE 10 MG/1
10 TABLET ORAL DAILY
Qty: 90 TABLET | Refills: 1 | Status: SHIPPED | OUTPATIENT
Start: 2021-07-07 | End: 2022-02-01 | Stop reason: SDUPTHER

## 2021-07-07 RX ORDER — LISINOPRIL 20 MG/1
20 TABLET ORAL DAILY
Qty: 90 TABLET | Refills: 1 | Status: SHIPPED | OUTPATIENT
Start: 2021-07-07 | End: 2022-04-29 | Stop reason: SDUPTHER

## 2021-07-07 RX ORDER — INSULIN GLARGINE 300 U/ML
20 INJECTION, SOLUTION SUBCUTANEOUS NIGHTLY
Qty: 3 PEN | Refills: 2 | Status: SHIPPED | OUTPATIENT
Start: 2021-07-07 | End: 2021-07-12

## 2021-07-07 NOTE — PROGRESS NOTES
7/7/2021    Yonny Mohamud    Chief Complaint   Patient presents with    1 Month Follow-Up     pt was put on a new diabetes medication and he states that he was very ill. He states he blood sugar would drop and felt like he was gonna pass out , he was throwing up and felt horrible . He states that he had severe constipation . He stopped  taking his medication on 7-2 and he is feeling better. HPI  History obtained from the patient. Ced Schofield is a 59 y.o. male who presents today for 1 month follow up on diabetes. Type 2 DM - Patient states that Trulicity made him nauseous, and he vomited nightly for the two weeks he was on it. Patient states that he lost 12 pounds in 2 weeks on this medication. He stopped this medication after 2 doses and started back on Toujeo 20 units every morning. He is continued on Metformin 1 1000 mg ER, stating that he takes this once in the morning and once in the evening. Denies episodes of hypoglycemia. Fasting blood glucose averages < 130. REVIEW OF SYMPTOMS  Review of Systems   Constitutional: Negative for chills and fever. Respiratory: Negative for cough and shortness of breath. Gastrointestinal: Negative for diarrhea and vomiting. PAST MEDICAL HISTORY  Past Medical History:   Diagnosis Date    Abnormal angiogram 4/15/15    100% left iliac instent restenosis, UnSuccessful PTA of rt iliac artery, PTA @ [facility]    Chest pain     Decreased testosterone level     Diabetes mellitus ([address]     H/O cardiovascular stress test 06/24/2019    Myocardial perfusion scan shows small size, moderate intensity, reversible perfusion defect in anterior wall.  H/O Doppler ultrasound 3/12/15    Arterial doppler. Abnormal right leg NOLAN suggestive of moderate to severe stenosis in the area of internal iliac artery which appears to be completely stenotic. I would recommend right lower extremity angiography.  H/O heart artery stent 3/17/16    3.0 X 22 MM & 3.0 X 18 MM sharmaine to RCA. Resource Strain:     Difficulty of Paying Living Expenses:    Food Insecurity:     Worried About Running Out of Food in the Last Year:     [address] in the Last Year:    Transportation Needs:     Lack of Transportation (Medical):  Lack of Transportation (Non-Medical):    Physical Activity:     Days of Exercise per Week:     Minutes of Exercise per Session:    Stress:     Feeling of Stress :    Social Connections:     Frequency of Communication with Friends and Family:     Frequency of Social Gatherings with Friends and Family:     Attends Scientologist Services:     Active Member of Clubs or Organizations:     Attends Club or Organization Meetings:     Marital Status:    Intimate Partner Violence:     Fear of Current or Ex-Partner:     Emotionally Abused:     Physically Abused:     Sexually Abused:         SURGICAL HISTORY  Past Surgical History:   Procedure Laterality Date    CARDIAC CATHETERIZATION  3/1/15    100% LAD, 90% RCA, 100% R. iliac stenosis    CARDIAC CATHETERIZATION  3/16/15    95% mid & 70% prox RCA    CARDIAC CATHETERIZATION  3/17/16    RCA mid 80%,     CORONARY ANGIOPLASTY WITH STENT PLACEMENT  3/1/15    Successful aspiration thrombectomy and stenting of the proximal LAD    CORONARY ANGIOPLASTY WITH STENT PLACEMENT  3/16/15    2.75 X 12 MM, 3.00 X 30 MM 2.75 X 8 MM rca     CORONARY ANGIOPLASTY WITH STENT PLACEMENT  3/17/16    3.0 X 22 MM & 3.0 X 18 MM sharmaine to RCA.     TONSILLECTOMY         CURRENT MEDICATIONS  Current Outpatient Medications   Medication Sig Dispense Refill    amLODIPine (NORVASC) 10 MG tablet Take 1 tablet by mouth daily 90 tablet 1    lisinopril (PRINIVIL;ZESTRIL) 20 MG tablet Take 1 tablet by mouth daily 90 tablet 1    Insulin Glargine, 2 Unit Dial, (TOUJEO MAX SOLOSTAR) 300 UNIT/ML SOPN Inject 20 Units into the skin nightly 3 pen 2    prasugrel (EFFIENT) 10 MG TABS TAKE 1 TABLET BY MOUTH DAILY 30 tablet 0    ULTICARE MICRO PEN NEEDLES 32G X 4 MM Neurological:      Mental Status: He is alert and oriented to person, place, and time. Comments: Cranial nerves II-XII grossly intact   Psychiatric:         Mood and Affect: Mood normal.         Behavior: Behavior normal.         ASSESSMENT & PLAN  1. Uncontrolled type 2 diabetes mellitus with hyperglycemia ([address]  Patient stopped Trulicity due to nausea and vomiting. Instructed the patient to take Metformin extended release only once daily (2000 mg). Recommended trying insulin glargine in the evenings instead of the mornings. Continue to monitor fasting blood glucose. If fasting blood glucose averages >130, increase insulin glargine by 1 unit every 3 days until fasting glucose averages <130. We will follow-up with the patient in 4 weeks. - Insulin Glargine, 2 Unit Dial, (TOUJEO MAX SOLOSTAR) 300 UNIT/ML SOPN; Inject 20 Units into the skin nightly  Dispense: 3 pen; Refill: 2    2. Essential hypertension  Well-controlled. Refilled medication. - amLODIPine (NORVASC) 10 MG tablet; Take 1 tablet by mouth daily  Dispense: 90 tablet; Refill: 1  - lisinopril (PRINIVIL;ZESTRIL) 20 MG tablet; Take 1 tablet by mouth daily  Dispense: 90 tablet; Refill: 1      Return in about 4 weeks (around 8/4/2021).             Electronically signed by Janet Flower PA-C on 7/7/2021

## 2021-07-12 ENCOUNTER — TELEPHONE (OUTPATIENT)
Dept: FAMILY MEDICINE CLINIC | Age: 61
End: 2021-07-12

## 2021-07-12 DIAGNOSIS — E11.65 UNCONTROLLED TYPE 2 DIABETES MELLITUS WITH HYPERGLYCEMIA (HCC): Primary | ICD-10-CM

## 2021-07-12 RX ORDER — INSULIN GLARGINE 100 [IU]/ML
20 INJECTION, SOLUTION SUBCUTANEOUS NIGHTLY
Qty: 6 PEN | Refills: 1 | Status: SHIPPED | OUTPATIENT
Start: 2021-07-12 | End: 2021-10-14

## 2021-07-12 NOTE — TELEPHONE ENCOUNTER
I already sent it. Please call the pharmacy and find out if they received the prescription or had any trouble filling it.

## 2021-07-13 NOTE — TELEPHONE ENCOUNTER
Called pharmacy and they received insulin order and have it ready for pt .  I called pt to let him know and he voiced understanding

## 2021-07-15 ENCOUNTER — OFFICE VISIT (OUTPATIENT)
Dept: FAMILY MEDICINE CLINIC | Age: 61
End: 2021-07-15

## 2021-07-15 DIAGNOSIS — E11.9 TYPE 2 DIABETES MELLITUS WITHOUT COMPLICATION, WITH LONG-TERM CURRENT USE OF INSULIN (HCC): Primary | ICD-10-CM

## 2021-07-15 DIAGNOSIS — Z79.4 TYPE 2 DIABETES MELLITUS WITHOUT COMPLICATION, WITH LONG-TERM CURRENT USE OF INSULIN (HCC): Primary | ICD-10-CM

## 2021-07-15 NOTE — PROGRESS NOTES
GOOD [facility]  Diabetes Self-Management Education & Support Program    SUMMARY  Diabetes self-care management training was completed related to Disease Process, Being Active, Monitoring and Taking Medications. The participant will return on 7/29/2021 to continue DSMES related to Healthy Eating. The participant did identify SMART Goal(s) as indicated, and will practice knowledge and skills related to Healthy Eating and Monitoring to improve diabetes self-management. EVALUATION:  Pt presents for DSMES follow up. He has stopped the GLP1 due to adverse reactions of n/v.  He has restarted basaglar 20u at night, 2000mg Metformin in the morning. His appetite has returned & he is feeling better, but reports his fasting glucose is trending back up. Monitoring regularly, at least 1x daily. BG today 178. Ranges 105-260. RECOMMENDATIONS:  Continue DSMES, focus on better nutrition. Less fast food, more meal prepping at home. Next provider visit is scheduled for 8/4/2021       SMART GOAL(S)  1. Log blood glucose readings 7 days over the next week  ACHIEVEMENT OF GOAL(S)  [] 0-24%     [x] 25-49%     [] 50-74%     [] %  2. Keep a food/activity journal at least 3 days over the next week  ACHIEVEMENT OF GOAL(S)  [x] 0-24%     [] 25-49%     [] 50-74%     [] %  3. Increase daily water intake from 32oz to 64oz. ACHIEVEMENT OF GOAL(S)  [] 0-24%     [x] 25-49%     [] 50-74%     [] %       DATE DSMES TOPIC EVALUATION     7/15/2021 WHAT IS DIABETES?   a. Role of the normal pancreas in energy balance and blood glucose control   b. The defect seen in diabetes   c. Signs & symptoms of diabetes   d. Diagnosis of diabetes   e.  Types of diabetes   f. Blood glucose targets in pregnant & non-pregnant adults       The participant knows   Their type of diabetes Yes   The basic physiologic defect Yes   Blood glucose targets Yes       DATE DSMES TOPIC EVALUATION     7/15/2021 HOW CAN BLOOD GLUCOSE MONITORING HELP ME?   a. Value of blood glucose monitoring   b. Realistic expectations   c. Blood glucose monitoring targets   d. Target adjustments   e. Setting A1C & blood glucose targets with provider   f. Meter selection    g. Technique for obtaining blood droplet   h. Blood glucose testing sites   i. Determining best times to test   j. Pregnancy recommendations   k. Data sharing with provider        The participant    Can demonstrate their glucometer procedure Yes   Logs their BG readings No    The participant needs to address: patient to keep a log of glucose readings and bring log/meter to every appointment. DATE DSMES TOPIC EVALUATION     7/15/2021 HOW DO MY DIABETES MEDICATIONS WORK?   a. Type 1 medications & methods    Insulin injections    Injection sites   b. Type 2 medications    Oral agents    GLP-1 agonists   c. Hypoglycemia symptoms & treatment    Glucagon emergency kits   d. General guidance regarding insulin use whether Type 1, 2 or gestational diabetes    Storage of insulin    Disposal     Traveling with medications   e. Barriers to medication adherence      The participant    Can describe the expected action & side effects of prescribed diabetes medications Yes.  Can demonstrate injection technique (if applicable) Yes. The participant needs to address:  Patient is not taking cholesterol medication due to increased pain in legs and joints. We discussed increase risk due to diabetes and smoking. Pt will consider alternatives such as Repatha and discuss with PCP. DATE DSMES TOPIC EVALUATION     7/15/2021 HOW DOES PHYSICAL ACTIVITY AFFECT MY DIABETES?   a. Benefits of physical activity   b. Beginning a program of physical activity    Walking    Pedometers    Goal setting   c. Structured physical activity program    Aerobic activity    Resistance    Flexibility    Balance   d. Physical activity program progression   e.  Safety issues   f. Barriers to physical activity   g. 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 Time spent: 60 minutes    RADHA Dixon RN  Diabetes Educator  [address] [place] [address]  [phone] office  [phone] cell  [phone] fax  [email or web address]

## 2021-07-29 ENCOUNTER — OFFICE VISIT (OUTPATIENT)
Dept: FAMILY MEDICINE CLINIC | Age: 61
End: 2021-07-29

## 2021-07-29 DIAGNOSIS — E11.9 TYPE 2 DIABETES MELLITUS WITHOUT COMPLICATION, WITH LONG-TERM CURRENT USE OF INSULIN (HCC): Primary | ICD-10-CM

## 2021-07-29 DIAGNOSIS — Z79.4 TYPE 2 DIABETES MELLITUS WITHOUT COMPLICATION, WITH LONG-TERM CURRENT USE OF INSULIN (HCC): Primary | ICD-10-CM

## 2021-07-29 NOTE — PROGRESS NOTES
GOOD [facility]  Diabetes Self-Management Education & Support Program    SUMMARY  Diabetes self-care management training was completed related to Healthy Eating. The participant will return on 8/26/2021 to continue DSMES related to Healthy Coping, Reducing Risks and Problem Solving. The participant did identify SMART Goal(s) as indicated, and will practice knowledge and skills related to Healthy Eating to improve diabetes self-management. EVALUATION:  Pt presents for [facility] session regarding Healthy Eating. The plate method was reviewed, in addition to how to read a nutrition label and the patient had the opportunity to practice reading food labels during the session. The pt received education on how to build a balanced plate and received an introduction to carb counting along with recommended goal of consuming 60-75g carb per meal.  Pt was able to correctly identify carb sources. Pt finished Toujeo taking 22 units, just started Basaglar 22 units yesterday. Will increase Basaglar to 24 units starting today with instruction to increase by 2 units weekly until fasting BG is 140 or lower. Pt has additional goals as follows in addition to SMART goals listed below:     Goal to eat consistently, avoid skipping meals. Increase water and decrease caffeine beverages. RECOMMENDATIONS:  Continue with DSMES. Next provider visit is scheduled for 8/4/2021       SMART GOAL(S)  1. Practice building a balanced meal at least 3 times over the next week  ACHIEVEMENT OF GOAL(S)  [x] 0-24%     [] 25-49%     [] 50-74%     [] %  2. Measure portion sizes at least 3 times over the next week  ACHIEVEMENT OF GOAL(S)  [x] 0-24%     [] 25-49%     [] 50-74%     [] %  3. Read food labels/look up nutrition value of foods 3 times over the next week  ACHIEVEMENT OF GOAL(S)  [x] 0-24%     [] 25-49%     [] 50-74%     [] %     DATE DSMES TOPIC EVALUATION     7/29/2021 WHAT CAN I EAT? a. General principles   b. Determining a healthy weight   c. Nutritional terms & tools    Healthy Plate method    Carbohydrate Counting    Reading food labels    Free apps   d. Pregnancy recommendations      The participant    Uses Healthy Plate principles in constructing meals No   Reads food labels in choosing acceptable foods No    The participant needs to address: pt would benefit from learning to pre-plan menu items including breakfast and lunch. He will make sure that he is consuming enough carbs without exceeding the recommendation of 60-75g/meal.     Time spent: 60 minutes    BEATRICE MarceloN RN  Diabetes Educator  [address] [place] [address]  [phone] office  [phone] cell  [phone] fax  [email or web address]

## 2021-08-04 ENCOUNTER — OFFICE VISIT (OUTPATIENT)
Dept: FAMILY MEDICINE CLINIC | Age: 61
End: 2021-08-04
Payer: COMMERCIAL

## 2021-08-04 VITALS
BODY MASS INDEX: 25.86 KG/M2 | DIASTOLIC BLOOD PRESSURE: 78 MMHG | SYSTOLIC BLOOD PRESSURE: 130 MMHG | HEART RATE: 54 BPM | OXYGEN SATURATION: 97 % | WEIGHT: 190.9 LBS | HEIGHT: 72 IN

## 2021-08-04 DIAGNOSIS — E11.9 TYPE 2 DIABETES MELLITUS WITHOUT COMPLICATION, WITH LONG-TERM CURRENT USE OF INSULIN (HCC): Primary | ICD-10-CM

## 2021-08-04 DIAGNOSIS — E78.2 MIXED HYPERLIPIDEMIA: ICD-10-CM

## 2021-08-04 DIAGNOSIS — Z79.4 TYPE 2 DIABETES MELLITUS WITHOUT COMPLICATION, WITH LONG-TERM CURRENT USE OF INSULIN (HCC): Primary | ICD-10-CM

## 2021-08-04 PROCEDURE — 99214 OFFICE O/P EST MOD 30 MIN: CPT | Performed by: PHYSICIAN ASSISTANT

## 2021-08-04 PROCEDURE — G8427 DOCREV CUR MEDS BY ELIG CLIN: HCPCS | Performed by: PHYSICIAN ASSISTANT

## 2021-08-04 PROCEDURE — 3046F HEMOGLOBIN A1C LEVEL >9.0%: CPT | Performed by: PHYSICIAN ASSISTANT

## 2021-08-04 PROCEDURE — 4004F PT TOBACCO SCREEN RCVD TLK: CPT | Performed by: PHYSICIAN ASSISTANT

## 2021-08-04 PROCEDURE — 2022F DILAT RTA XM EVC RTNOPTHY: CPT | Performed by: PHYSICIAN ASSISTANT

## 2021-08-04 PROCEDURE — 3017F COLORECTAL CA SCREEN DOC REV: CPT | Performed by: PHYSICIAN ASSISTANT

## 2021-08-04 PROCEDURE — G8419 CALC BMI OUT NRM PARAM NOF/U: HCPCS | Performed by: PHYSICIAN ASSISTANT

## 2021-08-04 NOTE — PROGRESS NOTES
8/4/2021    Renate Roth    Chief Complaint   Patient presents with    1 Month Follow-Up     pt states that he is here to f/u on his BS. He has a log he brought with him today . He said they are starting to go down        HPI  History obtained from the patient. Fidelia Angela is a 59 y.o. male who presents today for 4-week follow-up on diabetes. T2DM - Compliant with insulin glargine 22 units nightly. Patient has been following with diabetic educator. Fasting blood sugar has been averaging 100-130. Patient does note that his appetite has increased an he feels hungry all the time. He has continued to take the extended release Metformin twice daily instead of once daily as we instructed. REVIEW OF SYMPTOMS  Review of Systems   Constitutional: Negative for chills and fever. Respiratory: Negative for cough and shortness of breath. Gastrointestinal: Negative for diarrhea and vomiting. PAST MEDICAL HISTORY  Past Medical History:   Diagnosis Date    Abnormal angiogram 4/15/15    100% left iliac instent restenosis, UnSuccessful PTA of rt iliac artery, PTA @ [facility]    Chest pain     Decreased testosterone level     Diabetes mellitus ([address]     H/O cardiovascular stress test 06/24/2019    Myocardial perfusion scan shows small size, moderate intensity, reversible perfusion defect in anterior wall.  H/O Doppler ultrasound 3/12/15    Arterial doppler. Abnormal right leg NOLAN suggestive of moderate to severe stenosis in the area of internal iliac artery which appears to be completely stenotic. I would recommend right lower extremity angiography.     H/O heart artery stent 3/17/16    3.0 X 22 MM & 3.0 X 18 MM sharmaine to RCA.    H/O percutaneous left heart catheterization 3/1/15    100% LAD, 90% RCA, 100% R. iliac stenosis    H/O percutaneous left heart catheterization 3/16/15    95% mid & 70% prox RCA    H/O percutaneous left heart catheterization 3/17/16    RCA mid 80%,     H/O unstable angina     History of Doppler ultrasound 2016    Carotid-there is no flow limiting lesions BLT     Hx of myocardial infarction     Hyperlipidemia     Hypertension     Kidney stone     Lower ext. Arterial Doppler 10/26/2016    The  study is normal    [facility]     100% rt iliac stenosis    PAOD (peripheral arterial occlusive disease) ([facility])     S/P angioplasty with stent 3/1/15    Successful aspiration thrombectomy and stenting of the proximal LAD    Smoker     Status post percutaneous transluminal coronary angioplasty     STEMI (ST elevation myocardial infarction) ([facility] [address] 2015    Anterior Wall. FAMILY HISTORY  Family History   Problem Relation Age of Onset    Cancer Father     Diabetes Father     Heart Disease Father     Cancer Mother     Diabetes Mother     Heart Disease Mother        SOCIAL HISTORY  Social History     Socioeconomic History    Marital status:      Spouse name: None    Number of children: None    Years of education: None    Highest education level: None   Occupational History    None   Tobacco Use    Smoking status: Current Every Day Smoker     Packs/day: 1.50     Years: 25.00     Pack years: 37.50     Types: Cigarettes     Last attempt to quit: 3/1/2015     Years since quittin.4    Smokeless tobacco: Never Used   Vaping Use    Vaping Use: Never used   Substance and Sexual Activity    Alcohol use: Yes     Alcohol/week: 0.0 standard drinks     Comment: 2/month    Drug use: No    Sexual activity: Not Currently     Partners: Female   Other Topics Concern    None   Social History Narrative    None     Social Determinants of Health     Financial Resource Strain:     Difficulty of Paying Living Expenses:    Food Insecurity:     Worried About Running Out of Food in the Last Year:     Ran Out of Food in the Last Year:    Transportation Needs:     Lack of Transportation (Medical):      Lack of Transportation (Non-Medical):    Physical Activity:     Days of Exercise per Week:     Minutes of Exercise per Session:    Stress:     Feeling of Stress :    Social Connections:     Frequency of Communication with Friends and Family:     Frequency of Social Gatherings with Friends and Family:     Attends [employer] Services:     Active Member of Clubs or Organizations:     Attends Club or Organization Meetings:     Marital Status:    Intimate Partner Violence:     Fear of Current or Ex-Partner:     Emotionally Abused:     Physically Abused:     Sexually Abused:         SURGICAL HISTORY  Past Surgical History:   Procedure Laterality Date    CARDIAC CATHETERIZATION  3/1/15    100% LAD, 90% RCA, 100% R. iliac stenosis    CARDIAC CATHETERIZATION  3/16/15    95% mid & 70% prox RCA    CARDIAC CATHETERIZATION  3/17/16    RCA mid 80%,     CORONARY ANGIOPLASTY WITH STENT PLACEMENT  3/1/15    Successful aspiration thrombectomy and stenting of the proximal LAD    CORONARY ANGIOPLASTY WITH STENT PLACEMENT  3/16/15    2.75 X 12 MM, 3.00 X 30 MM 2.75 X 8 MM rca     CORONARY ANGIOPLASTY WITH STENT PLACEMENT  3/17/16    3.0 X 22 MM & 3.0 X 18 MM sharmaine to RCA.     TONSILLECTOMY         CURRENT MEDICATIONS  Current Outpatient Medications   Medication Sig Dispense Refill    metFORMIN (GLUCOPHAGE) 1000 MG tablet Take 1 tablet by mouth 2 times daily (with meals) 60 tablet 2    insulin glargine (BASAGLAR KWIKPEN) 100 UNIT/ML injection pen Inject 20 Units into the skin nightly 6 pen 1    amLODIPine (NORVASC) 10 MG tablet Take 1 tablet by mouth daily 90 tablet 1    lisinopril (PRINIVIL;ZESTRIL) 20 MG tablet Take 1 tablet by mouth daily 90 tablet 1    prasugrel (EFFIENT) 10 MG TABS TAKE 1 TABLET BY MOUTH DAILY 30 tablet 0    ULTICARE MICRO PEN NEEDLES 32G X 4 MM MISC USE AS DIRECTED 100 each 10    metoprolol tartrate (LOPRESSOR) 25 MG tablet Take 1 tablet by mouth 2 times daily 180 tablet 1    blood glucose test strips (ACCU-CHEK GUIDE) strip 1 each by In Vitro route daily As needed.  Lancets Misc. (ACCU-CHEK MULTICLIX LANCET DEV) KIT by Does not apply route      aspirin 81 MG EC tablet Take 1 tablet by mouth daily. 30 tablet 3    rosuvastatin (CRESTOR) 10 MG tablet Take 2 tablets by mouth nightly (Patient not taking: Reported on 7/7/2021) 90 tablet 1     No current facility-administered medications for this visit. ALLERGIES  Allergies   Allergen Reactions    Trulicity [Dulaglutide] Nausea And Vomiting       RECENT LABS    Lab Results   Component Value Date    LABA1C 10.7 06/08/2021     Lab Results   Component Value Date    .4 06/08/2021       Lab Results   Component Value Date    CHOL 239 (H) 06/08/2021    CHOL 215 (H) 02/12/2021    CHOL 217 (H) 07/27/2016     Lab Results   Component Value Date    LDLCHOLESTEROL 69 04/22/2016    LDLCALC 163 (H) 06/08/2021    LDLCALC 141 (H) 02/12/2021       Lab Results   Component Value Date    WBC 8.9 02/12/2021    HGB 15.1 02/12/2021    HCT 45.5 02/12/2021    MCV 89.7 02/12/2021     02/12/2021       PHYSICAL EXAM  /78   Pulse 54   Ht 6' (1.829 m)   Wt 190 lb 14.4 oz (86.6 kg)   SpO2 97%   BMI 25.89 kg/m²     Physical Exam  Constitutional:       Appearance: Normal appearance. HENT:      Head: Normocephalic and atraumatic. Eyes:      Comments: EOM grossly intact. Cardiovascular:      Rate and Rhythm: Normal rate and regular rhythm. Heart sounds: No murmur heard. No friction rub. No gallop. Pulmonary:      Effort: Pulmonary effort is normal.      Breath sounds: Normal breath sounds. No wheezing, rhonchi or rales. Skin:     General: Skin is warm and dry. Neurological:      Mental Status: He is alert and oriented to person, place, and time. Comments: Cranial nerves II-XII grossly intact   Psychiatric:         Mood and Affect: Mood normal.         Behavior: Behavior normal.         ASSESSMENT & PLAN  1.  Type 2 diabetes mellitus without complication, with long-term current use of insulin ([address]  Patient prefers to take Metformin twice daily, so I sent in immediate release tablets for him. Continue insulin glargine and glucose monitoring. Will follow up again in 4 weeks. His next A1C will be due at this time. - metFORMIN (GLUCOPHAGE) 1000 MG tablet; Take 1 tablet by mouth 2 times daily (with meals)  Dispense: 60 tablet; Refill: 2  - Hemoglobin A1C; Future    2. Mixed hyperlipidemia  Will recheck cholesterol and advise based on findings.   - Lipid Panel; Future      Return in about 5 weeks (around 9/9/2021).             Electronically signed by Jc Oliveira PA-C on 8/4/2021

## 2021-09-09 RX ORDER — PRASUGREL 10 MG/1
10 TABLET, FILM COATED ORAL DAILY
Qty: 30 TABLET | Refills: 0 | Status: SHIPPED | OUTPATIENT
Start: 2021-09-09 | End: 2021-11-10 | Stop reason: SDUPTHER

## 2021-11-10 RX ORDER — PRASUGREL 10 MG/1
10 TABLET, FILM COATED ORAL DAILY
Qty: 30 TABLET | Refills: 0 | Status: SHIPPED | OUTPATIENT
Start: 2021-11-10 | End: 2021-12-14 | Stop reason: SDUPTHER

## 2021-11-30 DIAGNOSIS — I10 ESSENTIAL HYPERTENSION: ICD-10-CM

## 2021-12-14 RX ORDER — PRASUGREL 10 MG/1
10 TABLET, FILM COATED ORAL DAILY
Qty: 30 TABLET | Refills: 0 | Status: SHIPPED | OUTPATIENT
Start: 2021-12-14 | End: 2022-01-21 | Stop reason: SDUPTHER

## 2022-01-21 RX ORDER — PRASUGREL 10 MG/1
10 TABLET, FILM COATED ORAL DAILY
Qty: 30 TABLET | Refills: 0 | Status: SHIPPED | OUTPATIENT
Start: 2022-01-21 | End: 2022-03-10 | Stop reason: SDUPTHER

## 2022-01-31 DIAGNOSIS — E78.2 MIXED HYPERLIPIDEMIA: ICD-10-CM

## 2022-01-31 DIAGNOSIS — Z79.4 TYPE 2 DIABETES MELLITUS WITHOUT COMPLICATION, WITH LONG-TERM CURRENT USE OF INSULIN (HCC): ICD-10-CM

## 2022-01-31 DIAGNOSIS — E11.9 TYPE 2 DIABETES MELLITUS WITHOUT COMPLICATION, WITH LONG-TERM CURRENT USE OF INSULIN (HCC): ICD-10-CM

## 2022-01-31 DIAGNOSIS — E11.65 UNCONTROLLED TYPE 2 DIABETES MELLITUS WITH HYPERGLYCEMIA (HCC): ICD-10-CM

## 2022-01-31 LAB
ANION GAP SERPL CALCULATED.3IONS-SCNC: 14 MMOL/L (ref 3–16)
BUN BLDV-MCNC: 16 MG/DL (ref 7–20)
CALCIUM SERPL-MCNC: 9.5 MG/DL (ref 8.3–10.6)
CHLORIDE BLD-SCNC: 103 MMOL/L (ref 99–110)
CHOLESTEROL, TOTAL: 239 MG/DL (ref 0–199)
CO2: 25 MMOL/L (ref 21–32)
CREAT SERPL-MCNC: 1.2 MG/DL (ref 0.8–1.3)
ESTIMATED AVERAGE GLUCOSE: 162.8 MG/DL
GFR AFRICAN AMERICAN: >60
GFR NON-AFRICAN AMERICAN: >60
GLUCOSE BLD-MCNC: 79 MG/DL (ref 70–99)
HBA1C MFR BLD: 7.3 %
HDLC SERPL-MCNC: 36 MG/DL (ref 40–60)
LDL CHOLESTEROL CALCULATED: 171 MG/DL
POTASSIUM SERPL-SCNC: 4.6 MMOL/L (ref 3.5–5.1)
SODIUM BLD-SCNC: 142 MMOL/L (ref 136–145)
TRIGL SERPL-MCNC: 160 MG/DL (ref 0–150)
VLDLC SERPL CALC-MCNC: 32 MG/DL

## 2022-02-01 ENCOUNTER — OFFICE VISIT (OUTPATIENT)
Dept: FAMILY MEDICINE CLINIC | Age: 62
End: 2022-02-01
Payer: MEDICARE

## 2022-02-01 VITALS
OXYGEN SATURATION: 98 % | HEART RATE: 63 BPM | WEIGHT: 186.9 LBS | BODY MASS INDEX: 25.32 KG/M2 | SYSTOLIC BLOOD PRESSURE: 136 MMHG | HEIGHT: 72 IN | DIASTOLIC BLOOD PRESSURE: 82 MMHG

## 2022-02-01 DIAGNOSIS — Z79.4 TYPE 2 DIABETES MELLITUS WITHOUT COMPLICATION, WITH LONG-TERM CURRENT USE OF INSULIN (HCC): Primary | ICD-10-CM

## 2022-02-01 DIAGNOSIS — I21.09 ST ELEVATION MYOCARDIAL INFARCTION (STEMI) OF ANTERIOR WALL, INITIAL EPISODE OF CARE (HCC): ICD-10-CM

## 2022-02-01 DIAGNOSIS — E78.2 MIXED HYPERLIPIDEMIA: ICD-10-CM

## 2022-02-01 DIAGNOSIS — R01.1 HEART MURMUR: ICD-10-CM

## 2022-02-01 DIAGNOSIS — I10 ESSENTIAL HYPERTENSION: ICD-10-CM

## 2022-02-01 DIAGNOSIS — E11.9 TYPE 2 DIABETES MELLITUS WITHOUT COMPLICATION, WITH LONG-TERM CURRENT USE OF INSULIN (HCC): Primary | ICD-10-CM

## 2022-02-01 PROCEDURE — 3017F COLORECTAL CA SCREEN DOC REV: CPT | Performed by: PHYSICIAN ASSISTANT

## 2022-02-01 PROCEDURE — G8419 CALC BMI OUT NRM PARAM NOF/U: HCPCS | Performed by: PHYSICIAN ASSISTANT

## 2022-02-01 PROCEDURE — 2022F DILAT RTA XM EVC RTNOPTHY: CPT | Performed by: PHYSICIAN ASSISTANT

## 2022-02-01 PROCEDURE — 4004F PT TOBACCO SCREEN RCVD TLK: CPT | Performed by: PHYSICIAN ASSISTANT

## 2022-02-01 PROCEDURE — 99214 OFFICE O/P EST MOD 30 MIN: CPT | Performed by: PHYSICIAN ASSISTANT

## 2022-02-01 PROCEDURE — 3051F HG A1C>EQUAL 7.0%<8.0%: CPT | Performed by: PHYSICIAN ASSISTANT

## 2022-02-01 PROCEDURE — G8484 FLU IMMUNIZE NO ADMIN: HCPCS | Performed by: PHYSICIAN ASSISTANT

## 2022-02-01 PROCEDURE — G8427 DOCREV CUR MEDS BY ELIG CLIN: HCPCS | Performed by: PHYSICIAN ASSISTANT

## 2022-02-01 RX ORDER — AMLODIPINE BESYLATE 10 MG/1
10 TABLET ORAL DAILY
Qty: 30 TABLET | Refills: 5 | Status: SHIPPED | OUTPATIENT
Start: 2022-02-01 | End: 2022-09-28

## 2022-02-01 RX ORDER — PRAVASTATIN SODIUM 40 MG
40 TABLET ORAL DAILY
Qty: 30 TABLET | Refills: 5 | Status: SHIPPED | OUTPATIENT
Start: 2022-02-01

## 2022-02-01 NOTE — PROGRESS NOTES
2/1/2022    [facility]    Chief Complaint   Patient presents with    6 Month Follow-Up    Medication Refill       HPI  History obtained from the patient. George Burr is a 59 y.o. male who presents today for 6 month follow up. Type 2 Diabetes - Patient is on Metformin 1 g twice daily, insulin glargine 22 units nightly. He has not been regularly checking fasting blood sugar. A1c went from 10.7% down to 7.3% over the last 6 months. HTN - Patient is compliant with lisinopril 20 mg daily, amlodipine 10 mg daily, metoprolol 25 mg twice daily. Patient denies home blood pressure monitoring. HLD - Patient does not Crestor 20 mg nightly. He states that he is tried all sorts of different statins and all of them caused muscle cramps. History of STEMI - Compliant with prasugrel (Effient) 10 mg daily. Patient was seeing Dr. Carrillo Arechiga but has not seen him in quite a while. He states that he is \"too scared\" to see him because every time he does, they tell him something is wrong. Patient does admit mild, random chest pain. It is not frequent or consistent, though. Health Maintenance - Patient's care gaps include LUNG SCREEN. Patient refuses lung screening stating that he will be ready for imaging when he is \"coughing up blood. \"    The ASCVD Risk score (Tonie Lambert., et al., 2013) failed to calculate for the following reasons: The patient has a prior MI or stroke diagnosis    REVIEW OF SYMPTOMS  Review of Systems   Constitutional: Negative for chills and fever. Respiratory: Negative for cough and shortness of breath. Gastrointestinal: Negative for diarrhea and vomiting.      PAST MEDICAL HISTORY  Past Medical History:   Diagnosis Date    Abnormal angiogram 4/15/15    100% left iliac instent restenosis, UnSuccessful PTA of rt iliac artery, PTA @ [facility]    Chest pain     Decreased testosterone level     Diabetes mellitus ([address]     H/O cardiovascular stress test 06/24/2019    Myocardial perfusion scan shows small size, moderate intensity, reversible perfusion defect in anterior wall.  H/O Doppler ultrasound 3/12/15    Arterial doppler. Abnormal right leg NOLAN suggestive of moderate to severe stenosis in the area of internal iliac artery which appears to be completely stenotic. I would recommend right lower extremity angiography.  H/O heart artery stent 3/17/16    3.0 X 22 MM & 3.0 X 18 MM sharmaine to RCA.    H/O percutaneous left heart catheterization 3/1/15    100% LAD, 90% RCA, 100% R. iliac stenosis    H/O percutaneous left heart catheterization 3/16/15    95% mid & 70% prox RCA    H/O percutaneous left heart catheterization 3/17/16    RCA mid 80%,     H/O unstable angina     History of Doppler ultrasound 04/22/2016    Carotid-there is no flow limiting lesions BLT     Hx of myocardial infarction     Hyperlipidemia     Hypertension     Kidney stone     Lower ext. Arterial Doppler 10/26/2016    The  study is normal    [facility]     100% rt iliac stenosis    PAOD (peripheral arterial occlusive disease) ([facility])     S/P angioplasty with stent 3/1/15    Successful aspiration thrombectomy and stenting of the proximal LAD    Smoker     Status post percutaneous transluminal coronary angioplasty     STEMI (ST elevation myocardial infarction) [facility] [address] 03/01/2015    Anterior Wall.        FAMILY HISTORY  Family History   Problem Relation Age of Onset    Cancer Father     Diabetes Father     Heart Disease Father     Cancer Mother     Diabetes Mother     Heart Disease Mother        SOCIAL HISTORY  Social History     Socioeconomic History    Marital status:      Spouse name: Not on file    Number of children: Not on file    Years of education: Not on file    Highest education level: Not on file   Occupational History    Not on file   Tobacco Use    Smoking status: Current Every Day Smoker     Packs/day: 1.50     Years: 25.00     Pack years: 37.50     Types: Cigarettes Last attempt to quit: 3/1/2015     Years since quittin.9    Smokeless tobacco: Never Used   Vaping Use    Vaping Use: Never used   Substance and Sexual Activity    Alcohol use: Yes     Alcohol/week: 0.0 standard drinks     Comment: 2/month    Drug use: No    Sexual activity: Not Currently     Partners: Female   Other Topics Concern    Not on file   Social History Narrative    Not on file     Social Determinants of Health     Financial Resource Strain:     Difficulty of Paying Living Expenses: Not on file   Food Insecurity:     Worried About Running Out of Food in the Last Year: Not on file    Alfredo of Food in the Last Year: Not on file   Transportation Needs:     Lack of Transportation (Medical): Not on file    Lack of Transportation (Non-Medical):  Not on file   Physical Activity:     Days of Exercise per Week: Not on file    Minutes of Exercise per Session: Not on file   Stress:     Feeling of Stress : Not on file   Social Connections:     Frequency of Communication with Friends and Family: Not on file    Frequency of Social Gatherings with Friends and Family: Not on file    Attends Confucianism Services: Not on file    Active Member of [address] [place] [address] [employer] or Organizations: Not on file    Attends Club or Organization Meetings: Not on file    Marital Status: Not on file   Intimate Partner Violence:     Fear of Current or Ex-Partner: Not on file    Emotionally Abused: Not on file    Physically Abused: Not on file    Sexually Abused: Not on file   Housing Stability:     Unable to Pay for Housing in the Last Year: Not on file    Number of [place] in the Last Year: Not on file    Unstable Housing in the Last Year: Not on file        SURGICAL HISTORY  Past Surgical History:   Procedure Laterality Date    CARDIAC CATHETERIZATION  3/1/15    100% LAD, 90% RCA, 100% R. iliac stenosis    CARDIAC CATHETERIZATION  3/16/15    95% mid & 70% prox RCA    CARDIAC CATHETERIZATION  3/17/16    RCA mid 80%,     CORONARY ANGIOPLASTY WITH STENT PLACEMENT  3/1/15    Successful aspiration thrombectomy and stenting of the proximal LAD    CORONARY ANGIOPLASTY WITH STENT PLACEMENT  3/16/15    2.75 X 12 MM, 3.00 X 30 MM 2.75 X 8 MM rca     CORONARY ANGIOPLASTY WITH STENT PLACEMENT  3/17/16    3.0 X 22 MM & 3.0 X 18 MM sharmaine to RCA.  TONSILLECTOMY         CURRENT MEDICATIONS  Current Outpatient Medications   Medication Sig Dispense Refill    metFORMIN (GLUCOPHAGE) 1000 MG tablet Take 1 tablet by mouth 2 times daily (with meals) 60 tablet 5    amLODIPine (NORVASC) 10 MG tablet Take 1 tablet by mouth daily 30 tablet 5    pravastatin (PRAVACHOL) 40 MG tablet Take 1 tablet by mouth daily 30 tablet 5    prasugrel (EFFIENT) 10 MG TABS Take 1 tablet by mouth daily 30 tablet 0    metoprolol tartrate (LOPRESSOR) 25 MG tablet TAKE 1 TABLET BY MOUTH 2 TIMES DAILY 180 tablet 0    BASAGLAR KWIKPEN 100 UNIT/ML injection pen INJECT 20 UNITS INTO THE SKIN NIGHTLY 36 mL 0    lisinopril (PRINIVIL;ZESTRIL) 20 MG tablet Take 1 tablet by mouth daily 90 tablet 1    ULTICARE MICRO PEN NEEDLES 32G X 4 MM MISC USE AS DIRECTED 100 each 10    blood glucose test strips (ACCU-CHEK GUIDE) strip 1 each by In Vitro route daily As needed.  Lancets Mis. (ACCU-CHEK MULTICLIX LANCET DEV) KIT by Does not apply route      aspirin 81 MG EC tablet Take 1 tablet by mouth daily. 30 tablet 3    rosuvastatin (CRESTOR) 10 MG tablet Take 2 tablets by mouth nightly (Patient not taking: Reported on 7/7/2021) 90 tablet 1     No current facility-administered medications for this visit.        ALLERGIES  Allergies   Allergen Reactions    Trulicity [Dulaglutide] Nausea And Vomiting       RECENT LABS    Lab Results   Component Value Date    LABA1C 7.3 01/31/2022     Lab Results   Component Value Date    .8 01/31/2022       Lab Results   Component Value Date    CHOL 239 (H) 01/31/2022    CHOL 239 (H) 06/08/2021    CHOL 215 (H) 02/12/2021     Lab Results Component Value Date    LDLCHOLESTEROL 69 04/22/2016    LDLCALC 171 (H) 01/31/2022    LDLCALC 163 (H) 06/08/2021    LDLCALC 141 (H) 02/12/2021       Lab Results   Component Value Date    WBC 8.9 02/12/2021    HGB 15.1 02/12/2021    HCT 45.5 02/12/2021    MCV 89.7 02/12/2021     02/12/2021       PHYSICAL EXAM  /82   Pulse 63   Ht 6' (1.829 m)   Wt 186 lb 14.4 oz (84.8 kg)   SpO2 98%   BMI 25.35 kg/m²     Physical Exam  Constitutional:       Appearance: Normal appearance. HENT:      Head: Normocephalic and atraumatic. Eyes:      Comments: EOM grossly intact. Cardiovascular:      Rate and Rhythm: Normal rate and regular rhythm. Heart sounds: Murmur heard. No friction rub. No gallop. Comments: Grade 2 systolic murmur best heard over the second right ICS. Pulmonary:      Effort: Pulmonary effort is normal.      Breath sounds: Normal breath sounds. No wheezing, rhonchi or rales. Skin:     General: Skin is warm and dry. Neurological:      Mental Status: He is alert and oriented to person, place, and time. Comments: Cranial nerves II-XII grossly intact   Psychiatric:         Mood and Affect: Mood normal.         Behavior: Behavior normal.         ASSESSMENT & PLAN  1. Type 2 diabetes mellitus without complication, with long-term current use of insulin (HCC)  Significant improvement in A1c. It went from 10.7% down to 7.3%. Refilled medication. Instructed the patient to check his fasting blood glucose, and if it is averaging higher than 120s, he should increase his dose of insulin glargine from 22 units nightly up to 25 units nightly. - metFORMIN (GLUCOPHAGE) 1000 MG tablet; Take 1 tablet by mouth 2 times daily (with meals)  Dispense: 60 tablet; Refill: 5    2. Essential hypertension  Well-controlled. Refilled medication. - amLODIPine (NORVASC) 10 MG tablet; Take 1 tablet by mouth daily  Dispense: 30 tablet; Refill: 5    3.  Mixed hyperlipidemia  Explained to the patient the risks of high cholesterol. I asked him to try 1 more statin: Pravastatin. If this gives him muscle cramps, he does not have to take it. However, this is generally one of the most well-tolerated statins, so I am hoping he finds it tolerable. If he is able to stay on this, I instructed the patient to come back in 8 weeks for fasting blood work to recheck cholesterol.  - pravastatin (PRAVACHOL) 40 MG tablet; Take 1 tablet by mouth daily  Dispense: 30 tablet; Refill: 5    4. ST elevation myocardial infarction (STEMI) of anterior wall, initial episode of care [facility])  Recommended the patient continue following with cardiology, but he declines this. 5. Heart murmur  Strongly recommended following with cardiology or at least allowing me to order an echo, but the patient refuses. Return in about 6 months (around 8/1/2022).             Electronically signed by Irene Gonzalez PA-C on 2/1/2022

## 2022-03-10 RX ORDER — PRASUGREL 10 MG/1
10 TABLET, FILM COATED ORAL DAILY
Qty: 30 TABLET | Refills: 0 | Status: SHIPPED | OUTPATIENT
Start: 2022-03-10 | End: 2022-04-29 | Stop reason: SDUPTHER

## 2022-04-12 DIAGNOSIS — E78.2 MIXED HYPERLIPIDEMIA: Primary | ICD-10-CM

## 2022-04-29 DIAGNOSIS — I10 ESSENTIAL HYPERTENSION: ICD-10-CM

## 2022-04-29 RX ORDER — LISINOPRIL 20 MG/1
20 TABLET ORAL DAILY
Qty: 90 TABLET | Refills: 1 | Status: SHIPPED | OUTPATIENT
Start: 2022-04-29 | End: 2022-10-26

## 2022-04-29 RX ORDER — PRASUGREL 10 MG/1
10 TABLET, FILM COATED ORAL DAILY
Qty: 90 TABLET | Refills: 1 | Status: SHIPPED | OUTPATIENT
Start: 2022-04-29 | End: 2022-10-26

## 2022-09-07 DIAGNOSIS — I10 ESSENTIAL HYPERTENSION: ICD-10-CM

## 2022-09-26 RX ORDER — PEN NEEDLE, DIABETIC 29 G X1/2"
NEEDLE, DISPOSABLE MISCELLANEOUS
Refills: 0 | OUTPATIENT
Start: 2022-09-26

## 2022-09-28 ENCOUNTER — OFFICE VISIT (OUTPATIENT)
Dept: FAMILY MEDICINE CLINIC | Age: 62
End: 2022-09-28
Payer: MEDICARE

## 2022-09-28 VITALS
BODY MASS INDEX: 25.72 KG/M2 | WEIGHT: 189.9 LBS | DIASTOLIC BLOOD PRESSURE: 94 MMHG | HEIGHT: 72 IN | HEART RATE: 72 BPM | OXYGEN SATURATION: 96 % | SYSTOLIC BLOOD PRESSURE: 142 MMHG

## 2022-09-28 DIAGNOSIS — E78.5 HYPERLIPIDEMIA, UNSPECIFIED HYPERLIPIDEMIA TYPE: ICD-10-CM

## 2022-09-28 DIAGNOSIS — N52.01 ERECTILE DYSFUNCTION DUE TO ARTERIAL INSUFFICIENCY: ICD-10-CM

## 2022-09-28 DIAGNOSIS — I21.09 ST ELEVATION MYOCARDIAL INFARCTION (STEMI) OF ANTERIOR WALL, INITIAL EPISODE OF CARE (HCC): Primary | ICD-10-CM

## 2022-09-28 DIAGNOSIS — Z79.4 TYPE 2 DIABETES MELLITUS WITHOUT COMPLICATION, WITH LONG-TERM CURRENT USE OF INSULIN (HCC): ICD-10-CM

## 2022-09-28 DIAGNOSIS — E11.9 TYPE 2 DIABETES MELLITUS WITHOUT COMPLICATION, WITH LONG-TERM CURRENT USE OF INSULIN (HCC): ICD-10-CM

## 2022-09-28 PROCEDURE — 2022F DILAT RTA XM EVC RTNOPTHY: CPT | Performed by: PHYSICIAN ASSISTANT

## 2022-09-28 PROCEDURE — 3051F HG A1C>EQUAL 7.0%<8.0%: CPT | Performed by: PHYSICIAN ASSISTANT

## 2022-09-28 PROCEDURE — 99215 OFFICE O/P EST HI 40 MIN: CPT | Performed by: PHYSICIAN ASSISTANT

## 2022-09-28 PROCEDURE — G8419 CALC BMI OUT NRM PARAM NOF/U: HCPCS | Performed by: PHYSICIAN ASSISTANT

## 2022-09-28 PROCEDURE — G8427 DOCREV CUR MEDS BY ELIG CLIN: HCPCS | Performed by: PHYSICIAN ASSISTANT

## 2022-09-28 PROCEDURE — 4004F PT TOBACCO SCREEN RCVD TLK: CPT | Performed by: PHYSICIAN ASSISTANT

## 2022-09-28 PROCEDURE — 3017F COLORECTAL CA SCREEN DOC REV: CPT | Performed by: PHYSICIAN ASSISTANT

## 2022-09-28 RX ORDER — SILDENAFIL 100 MG/1
100 TABLET, FILM COATED ORAL PRN
Qty: 30 TABLET | Refills: 2 | Status: SHIPPED | OUTPATIENT
Start: 2022-09-28

## 2022-09-28 ASSESSMENT — PATIENT HEALTH QUESTIONNAIRE - PHQ9
1. LITTLE INTEREST OR PLEASURE IN DOING THINGS: 0
2. FEELING DOWN, DEPRESSED OR HOPELESS: 0
SUM OF ALL RESPONSES TO PHQ9 QUESTIONS 1 & 2: 0
SUM OF ALL RESPONSES TO PHQ QUESTIONS 1-9: 0

## 2022-09-28 NOTE — PROGRESS NOTES
9/28/2022    Oral Carter    Chief Complaint   Patient presents with    Check-Up       HPI  History was obtained from pt. Mary Lou Skaggs is a 57 y.o. male with a PMHx as listed below who presents today for routine office visit. Patient feels in his normal health. He has no complaints just wanted to have his meds refilled. Last a1c was 7.3%  LDL - 171 -we discussed that his goal is 70 or below for secondary prevention but he has not been able to tolerate at least 3-4 different statins. Cardiology - doesn't see anyone currently -he says he only goes to see them when he has a heart attack. Patient would like to be prescribed Viagra and wanted to know that he was doing that and Cialis was. 1. ST elevation myocardial infarction (STEMI) of anterior wall, initial episode of care ([facility] [address]    2. Type 2 diabetes mellitus without complication, with long-term current use of insulin ([address]    3. Hyperlipidemia, unspecified hyperlipidemia type    4. Erectile dysfunction due to arterial insufficiency         REVIEW OF SYMPTOMS    Review of Systems    PAST MEDICAL HISTORY  Past Medical History:   Diagnosis Date    Abnormal angiogram 4/15/15    100% left iliac instent restenosis, UnSuccessful PTA of rt iliac artery, PTA @ [facility]    Chest pain     Decreased testosterone level     Diabetes mellitus ([facility] [address]     H/O cardiovascular stress test 06/24/2019    Myocardial perfusion scan shows small size, moderate intensity, reversible perfusion defect in anterior wall. H/O Doppler ultrasound 3/12/15    Arterial doppler. Abnormal right leg NOLAN suggestive of moderate to severe stenosis in the area of internal iliac artery which appears to be completely stenotic. I would recommend right lower extremity angiography. H/O heart artery stent 3/17/16    3.0 X 22 MM & 3.0 X 18 MM sharmaine to RCA.     H/O percutaneous left heart catheterization 3/1/15    100% LAD, 90% RCA, 100% R. iliac stenosis    H/O percutaneous left heart catheterization 3/16/15 95% mid & 70% prox RCA    H/O percutaneous left heart catheterization 3/17/16    RCA mid 80%,     H/O unstable angina     History of Doppler ultrasound 2016    Carotid-there is no flow limiting lesions BLT     Hx of myocardial infarction     Hyperlipidemia     Hypertension     Kidney stone     Lower ext. Arterial Doppler 10/26/2016    The  study is normal    PAD (peripheral artery disease) ([facility])     100% rt iliac stenosis    PAOD (peripheral arterial occlusive disease) ([facility])     S/P angioplasty with stent 3/1/15    Successful aspiration thrombectomy and stenting of the proximal LAD    Smoker     Status post percutaneous transluminal coronary angioplasty     STEMI (ST elevation myocardial infarction) ([facility] [address] 2015    Anterior Wall. FAMILY HISTORY  Family History   Problem Relation Age of Onset    Cancer Father     Diabetes Father     Heart Disease Father     Cancer Mother     Diabetes Mother     Heart Disease Mother        SOCIAL HISTORY  Social History     Socioeconomic History    Marital status:      Spouse name: None    Number of children: None    Years of education: None    Highest education level: None   Tobacco Use    Smoking status: Every Day     Packs/day: 1.50     Years: 25.00     Pack years: 37.50     Types: Cigarettes     Last attempt to quit: 3/1/2015     Years since quittin.5    Smokeless tobacco: Never   Vaping Use    Vaping Use: Never used   Substance and Sexual Activity    Alcohol use:  Yes     Alcohol/week: 0.0 standard drinks     Comment: 2/month    Drug use: No    Sexual activity: Not Currently     Partners: Female        SURGICAL HISTORY  Past Surgical History:   Procedure Laterality Date    CARDIAC CATHETERIZATION  3/1/15    100% LAD, 90% RCA, 100% R. iliac stenosis    CARDIAC CATHETERIZATION  3/16/15    95% mid & 70% prox RCA    CARDIAC CATHETERIZATION  3/17/16    RCA mid 80%,     CORONARY ANGIOPLASTY WITH STENT PLACEMENT  3/1/15    Successful aspiration thrombectomy and stenting of the proximal LAD    CORONARY ANGIOPLASTY WITH STENT PLACEMENT  3/16/15    2.75 X 12 MM, 3.00 X 30 MM 2.75 X 8 MM rca     CORONARY ANGIOPLASTY WITH STENT PLACEMENT  3/17/16    3.0 X 22 MM & 3.0 X 18 MM sharmaine to RCA. TONSILLECTOMY                   CURRENT MEDICATIONS  Current Outpatient Medications   Medication Sig Dispense Refill    Evolocumab 140 MG/ML SOSY Inject 3 mLs into the skin every 30 days 3 mL 2    sildenafil (VIAGRA) 100 MG tablet Take 1 tablet by mouth as needed for Erectile Dysfunction 30 tablet 2    metoprolol tartrate (LOPRESSOR) 25 MG tablet Take 1 tablet by mouth 2 times daily 180 tablet 0    prasugrel (EFFIENT) 10 MG TABS Take 1 tablet by mouth daily 90 tablet 1    lisinopril (PRINIVIL;ZESTRIL) 20 MG tablet Take 1 tablet by mouth daily 90 tablet 1    metFORMIN (GLUCOPHAGE) 1000 MG tablet Take 1 tablet by mouth 2 times daily (with meals) 60 tablet 5    amLODIPine (NORVASC) 10 MG tablet Take 1 tablet by mouth daily 30 tablet 5    pravastatin (PRAVACHOL) 40 MG tablet Take 1 tablet by mouth daily 30 tablet 5    BASAGLAR KWIKPEN 100 UNIT/ML injection pen INJECT 20 UNITS INTO THE SKIN NIGHTLY 36 mL 0    ULTICARE MICRO PEN NEEDLES 32G X 4 MM MISC USE AS DIRECTED 100 each 10    blood glucose test strips (ASCENSIA AUTODISC VI;ONE TOUCH ULTRA TEST VI) strip 1 each by In Vitro route daily As needed. Lancets Misc. (ACCU-CHEK MULTICLIX LANCET DEV) KIT by Does not apply route      aspirin 81 MG EC tablet Take 1 tablet by mouth daily. 30 tablet 3    rosuvastatin (CRESTOR) 10 MG tablet Take 2 tablets by mouth nightly (Patient not taking: No sig reported) 90 tablet 1     No current facility-administered medications for this visit.        ALLERGIES  Allergies   Allergen Reactions    Trulicity [Dulaglutide] Nausea And Vomiting       PHYSICAL EXAM    BP (!) 142/94 (Site: Right Upper Arm, Position: Sitting, Cuff Size: Medium Adult)   Pulse 72   Ht 6' (1.829 m)   Wt 189 lb 14.4 oz (86.1 kg) elevation myocardial infarction (STEMI) of anterior wall, initial episode of care ([facility] [address]    - Evolocumab 140 MG/ML SOSY; Inject 3 mLs into the skin every 30 days  Dispense: 3 mL; Refill: 2  - CBC with Auto Differential; Future  - Comprehensive Metabolic Panel; Future    2. Type 2 diabetes mellitus without complication, with long-term current use of insulin (HCC)  Almost had good control at the last time  - Hemoglobin A1C; Future  - Microalbumin / Creatinine Urine Ratio; Future    3. Hyperlipidemia, unspecified hyperlipidemia type  Not at goal, want to begin Repatha, will see if it gets approved by insurance  If not we will try Zetia and encourage patient to follow-up with cardiology  - Lipid, Fasting; Future    4. Erectile dysfunction due to arterial insufficiency  Patient will need to take Viagra an hour before planned sexual activity  - sildenafil (VIAGRA) 100 MG tablet; Take 1 tablet by mouth as needed for Erectile Dysfunction  Dispense: 30 tablet; Refill: 2    Patient needs to come in this week for fasting labs    Return in about 3 months (around 12/28/2022). 41 minutes total precharting, preparing note and in the room with the patient  Electronically signed by Rehan Minaya on 9/28/2022      Comment: Please note this report has been produced using speech recognition software and may contain errors related to that system including errors in grammar, punctuation, and spelling, as well as words and phrases that may be inappropriate. If there are any questions or concerns please feel free to contact the dictating provider for clarification.

## 2022-09-29 ENCOUNTER — TELEPHONE (OUTPATIENT)
Dept: FAMILY MEDICINE CLINIC | Age: 62
End: 2022-09-29

## 2022-09-29 DIAGNOSIS — I21.09 ST ELEVATION MYOCARDIAL INFARCTION (STEMI) OF ANTERIOR WALL, INITIAL EPISODE OF CARE (HCC): ICD-10-CM

## 2022-09-29 DIAGNOSIS — I10 ESSENTIAL HYPERTENSION: ICD-10-CM

## 2022-09-29 RX ORDER — PRASUGREL 10 MG/1
10 TABLET, FILM COATED ORAL DAILY
Qty: 90 TABLET | Refills: 1 | OUTPATIENT
Start: 2022-09-29 | End: 2023-03-28

## 2022-09-29 NOTE — TELEPHONE ENCOUNTER
Re:    Evolocumab 140 MG/ML SOSY     Please call regarding the way and quantity this is to be dispensed.

## 2022-12-29 DIAGNOSIS — E11.65 UNCONTROLLED TYPE 2 DIABETES MELLITUS WITH HYPERGLYCEMIA (HCC): ICD-10-CM

## 2022-12-30 DIAGNOSIS — I10 ESSENTIAL HYPERTENSION: ICD-10-CM

## 2022-12-30 RX ORDER — AMLODIPINE BESYLATE 10 MG/1
10 TABLET ORAL DAILY
Qty: 30 TABLET | Refills: 5 | Status: SHIPPED | OUTPATIENT
Start: 2022-12-30 | End: 2023-06-28

## 2022-12-30 RX ORDER — PRASUGREL 10 MG/1
10 TABLET, FILM COATED ORAL DAILY
Qty: 90 TABLET | Refills: 1 | Status: SHIPPED | OUTPATIENT
Start: 2022-12-30 | End: 2023-06-28

## 2022-12-30 RX ORDER — INSULIN GLARGINE 300 U/ML
20 INJECTION, SOLUTION SUBCUTANEOUS NIGHTLY
Qty: 3 ADJUSTABLE DOSE PRE-FILLED PEN SYRINGE | Refills: 2 | Status: SHIPPED | OUTPATIENT
Start: 2022-12-30 | End: 2023-03-30

## 2023-01-04 DIAGNOSIS — E11.9 TYPE 2 DIABETES MELLITUS WITHOUT COMPLICATION, WITH LONG-TERM CURRENT USE OF INSULIN (HCC): ICD-10-CM

## 2023-01-04 DIAGNOSIS — Z79.4 TYPE 2 DIABETES MELLITUS WITHOUT COMPLICATION, WITH LONG-TERM CURRENT USE OF INSULIN (HCC): ICD-10-CM

## 2023-03-16 DIAGNOSIS — I10 ESSENTIAL HYPERTENSION: ICD-10-CM

## 2023-03-17 DIAGNOSIS — I10 ESSENTIAL HYPERTENSION: ICD-10-CM

## 2023-03-17 RX ORDER — LISINOPRIL 20 MG/1
20 TABLET ORAL DAILY
Qty: 90 TABLET | Refills: 1 | Status: SHIPPED | OUTPATIENT
Start: 2023-03-17 | End: 2023-09-13

## 2023-03-30 ENCOUNTER — OFFICE VISIT (OUTPATIENT)
Dept: FAMILY MEDICINE CLINIC | Age: 63
End: 2023-03-30
Payer: MEDICARE

## 2023-03-30 VITALS
RESPIRATION RATE: 16 BRPM | DIASTOLIC BLOOD PRESSURE: 77 MMHG | WEIGHT: 188 LBS | SYSTOLIC BLOOD PRESSURE: 127 MMHG | BODY MASS INDEX: 25.47 KG/M2 | OXYGEN SATURATION: 96 % | HEIGHT: 72 IN | HEART RATE: 59 BPM

## 2023-03-30 DIAGNOSIS — I73.9 PAD (PERIPHERAL ARTERY DISEASE) (HCC): ICD-10-CM

## 2023-03-30 DIAGNOSIS — E78.2 MIXED HYPERLIPIDEMIA: ICD-10-CM

## 2023-03-30 DIAGNOSIS — I10 PRIMARY HYPERTENSION: ICD-10-CM

## 2023-03-30 DIAGNOSIS — I25.2 HISTORY OF ST ELEVATION MYOCARDIAL INFARCTION (STEMI): ICD-10-CM

## 2023-03-30 DIAGNOSIS — E11.9 TYPE 2 DIABETES MELLITUS WITHOUT COMPLICATION, WITH LONG-TERM CURRENT USE OF INSULIN (HCC): Primary | ICD-10-CM

## 2023-03-30 DIAGNOSIS — Z79.4 TYPE 2 DIABETES MELLITUS WITHOUT COMPLICATION, WITH LONG-TERM CURRENT USE OF INSULIN (HCC): Primary | ICD-10-CM

## 2023-03-30 PROCEDURE — 4004F PT TOBACCO SCREEN RCVD TLK: CPT | Performed by: PHYSICIAN ASSISTANT

## 2023-03-30 PROCEDURE — 3017F COLORECTAL CA SCREEN DOC REV: CPT | Performed by: PHYSICIAN ASSISTANT

## 2023-03-30 PROCEDURE — G8419 CALC BMI OUT NRM PARAM NOF/U: HCPCS | Performed by: PHYSICIAN ASSISTANT

## 2023-03-30 PROCEDURE — G8484 FLU IMMUNIZE NO ADMIN: HCPCS | Performed by: PHYSICIAN ASSISTANT

## 2023-03-30 PROCEDURE — 3078F DIAST BP <80 MM HG: CPT | Performed by: PHYSICIAN ASSISTANT

## 2023-03-30 PROCEDURE — 3074F SYST BP LT 130 MM HG: CPT | Performed by: PHYSICIAN ASSISTANT

## 2023-03-30 PROCEDURE — 99214 OFFICE O/P EST MOD 30 MIN: CPT | Performed by: PHYSICIAN ASSISTANT

## 2023-03-30 PROCEDURE — G8427 DOCREV CUR MEDS BY ELIG CLIN: HCPCS | Performed by: PHYSICIAN ASSISTANT

## 2023-03-30 PROCEDURE — 1123F ACP DISCUSS/DSCN MKR DOCD: CPT | Performed by: PHYSICIAN ASSISTANT

## 2023-03-30 PROCEDURE — 2022F DILAT RTA XM EVC RTNOPTHY: CPT | Performed by: PHYSICIAN ASSISTANT

## 2023-03-30 PROCEDURE — 3046F HEMOGLOBIN A1C LEVEL >9.0%: CPT | Performed by: PHYSICIAN ASSISTANT

## 2023-03-30 RX ORDER — EVOLOCUMAB 140 MG/ML
420 INJECTION, SOLUTION SUBCUTANEOUS
Qty: 3 ML | Refills: 5 | Status: SHIPPED | OUTPATIENT
Start: 2023-03-30 | End: 2023-09-26

## 2023-03-30 NOTE — PATIENT INSTRUCTIONS
Let me know who you'd like to see for PAD (peripheral arterial disease) - either go back to the [facility] group OR try the new vascular group here in [facility]. Please come by for FASTING blood work sometime within the nest 2 weeks. Check and see if you are still on pravastatin when you get home.      You will be due for 5

## 2023-03-30 NOTE — PROGRESS NOTES
3/30/2023    Rachel Hicks    Chief Complaint   Patient presents with    6 Month Follow-Up     Presenting for 6 month follow up visit. HPI  History obtained from the patient. Lou Lara is a 70 y.o. male who presents today for 6 month follow up. Diabetes - Patient takes Metformin 1000 mg BID and Insulin glargine 22 units nightly. States that his blood glucose levels vary a lot. HTN - Compliant with amlodipine 10 mg daily, lisinopril 20 mg daily, metoprolol 25 mg BID    Hisotry of STEMI / HLD - Patient states that every statin he has tried caused muscle cramps. PAD - Patient has stents placed at [facility]. Unfortunately, he is experiencing claudication symptoms in his left lower extremity again. REVIEW OF SYMPTOMS  Review of Systems   Constitutional: Negative for chills and fever. Respiratory: Negative for cough and shortness of breath. Gastrointestinal: Negative for diarrhea and vomiting. PAST MEDICAL HISTORY  Past Medical History:   Diagnosis Date    Abnormal angiogram 4/15/15    100% left iliac instent restenosis, UnSuccessful PTA of rt iliac artery, PTA @ [facility]    Chest pain     Decreased testosterone level     Diabetes mellitus ([address]     H/O cardiovascular stress test 06/24/2019    Myocardial perfusion scan shows small size, moderate intensity, reversible perfusion defect in anterior wall. H/O Doppler ultrasound 3/12/15    Arterial doppler. Abnormal right leg NOLAN suggestive of moderate to severe stenosis in the area of internal iliac artery which appears to be completely stenotic. I would recommend right lower extremity angiography. H/O heart artery stent 3/17/16    3.0 X 22 MM & 3.0 X 18 MM sharmaine to RCA.     H/O percutaneous left heart catheterization 3/1/15    100% LAD, 90% RCA, 100% R. iliac stenosis    H/O percutaneous left heart catheterization 3/16/15    95% mid & 70% prox RCA    H/O percutaneous left heart catheterization 3/17/16    RCA mid 80%,     H/O unstable angina

## 2023-04-14 ENCOUNTER — TELEPHONE (OUTPATIENT)
Dept: FAMILY MEDICINE CLINIC | Age: 63
End: 2023-04-14

## 2023-05-08 ENCOUNTER — TELEPHONE (OUTPATIENT)
Dept: FAMILY MEDICINE CLINIC | Age: 63
End: 2023-05-08

## 2023-05-08 NOTE — TELEPHONE ENCOUNTER
Patient walked into the office regarding balance due $105.03 - I called the billing office and was advised to call the patient and have him call [employer] because they did not pay anything - I spoke with patient and voiced understanding.

## 2023-05-09 ENCOUNTER — TELEPHONE (OUTPATIENT)
Dept: FAMILY MEDICINE CLINIC | Age: 63
End: 2023-05-09

## 2023-05-09 NOTE — TELEPHONE ENCOUNTER
Patient called and stated that Dr Rocael Bruce wants to remove a bladder stone 5-15-23. Patient needs to be off prasugrel (effient)blood thinner 5 days prior to th procedure. Is this ok?    Call patient and advise

## 2023-05-10 ENCOUNTER — HOSPITAL ENCOUNTER (EMERGENCY)
Age: 63
Discharge: HOME | End: 2023-05-10
Payer: MEDICARE

## 2023-05-10 VITALS
SYSTOLIC BLOOD PRESSURE: 158 MMHG | DIASTOLIC BLOOD PRESSURE: 100 MMHG | BODY MASS INDEX: 22.92 KG/M2 | WEIGHT: 169 LBS | OXYGEN SATURATION: 95 % | RESPIRATION RATE: 14 BRPM | TEMPERATURE: 97.9 F | HEART RATE: 61 BPM

## 2023-05-10 DIAGNOSIS — R31.9 HEMATURIA, UNSPECIFIED TYPE: Primary | ICD-10-CM

## 2023-05-10 DIAGNOSIS — N21.0 BLADDER STONE: ICD-10-CM

## 2023-05-10 LAB
ANION GAP SERPL CALCULATED.3IONS-SCNC: 11 MMOL/L (ref 4–16)
BACTERIA: NEGATIVE /HPF
BASOPHILS ABSOLUTE: 0 K/CU MM
BASOPHILS RELATIVE PERCENT: 0.2 % (ref 0–1)
BILIRUBIN URINE: NEGATIVE MG/DL
BLOOD, URINE: ABNORMAL
BUN SERPL-MCNC: 23 MG/DL (ref 6–23)
CALCIUM OXALATE CRYSTALS: ABNORMAL /HPF
CALCIUM SERPL-MCNC: 9.3 MG/DL (ref 8.3–10.6)
CHLORIDE BLD-SCNC: 106 MMOL/L (ref 99–110)
CLARITY: ABNORMAL
CO2: 22 MMOL/L (ref 21–32)
COLOR: YELLOW
CREAT SERPL-MCNC: 1.1 MG/DL (ref 0.9–1.3)
DIFFERENTIAL TYPE: ABNORMAL
EOSINOPHILS ABSOLUTE: 0.3 K/CU MM
EOSINOPHILS RELATIVE PERCENT: 2.3 % (ref 0–3)
GFR SERPL CREATININE-BSD FRML MDRD: >60 ML/MIN/1.73M2
GLUCOSE SERPL-MCNC: 193 MG/DL (ref 70–99)
GLUCOSE, URINE: NEGATIVE MG/DL
HCT VFR BLD CALC: 44.4 % (ref 42–52)
HEMOGLOBIN: 14.8 GM/DL (ref 13.5–18)
IMMATURE NEUTROPHIL %: 0.5 % (ref 0–0.43)
KETONES, URINE: NEGATIVE MG/DL
LEUKOCYTE ESTERASE, URINE: ABNORMAL
LYMPHOCYTES ABSOLUTE: 2.4 K/CU MM
LYMPHOCYTES RELATIVE PERCENT: 19.6 % (ref 24–44)
MCH RBC QN AUTO: 29.3 PG (ref 27–31)
MCHC RBC AUTO-ENTMCNC: 33.3 % (ref 32–36)
MCV RBC AUTO: 87.9 FL (ref 78–100)
MONOCYTES ABSOLUTE: 0.9 K/CU MM
MONOCYTES RELATIVE PERCENT: 7.4 % (ref 0–4)
MUCUS: ABNORMAL HPF
NITRITE URINE, QUANTITATIVE: NEGATIVE
NUCLEATED RBC %: 0 %
PDW BLD-RTO: 12.9 % (ref 11.7–14.9)
PH, URINE: 5 (ref 5–8)
PLATELET # BLD: 278 K/CU MM (ref 140–440)
PMV BLD AUTO: 11.1 FL (ref 7.5–11.1)
POTASSIUM SERPL-SCNC: 4 MMOL/L (ref 3.5–5.1)
PROTEIN UA: 100 MG/DL
RBC # BLD: 5.05 M/CU MM (ref 4.6–6.2)
RBC URINE: 1023 /HPF (ref 0–3)
SEGMENTED NEUTROPHILS ABSOLUTE COUNT: 8.6 K/CU MM
SEGMENTED NEUTROPHILS RELATIVE PERCENT: 70 % (ref 36–66)
SODIUM BLD-SCNC: 139 MMOL/L (ref 135–145)
SPECIFIC GRAVITY UA: >1.03 (ref 1–1.03)
TOTAL IMMATURE NEUTOROPHIL: 0.06 K/CU MM
TOTAL NUCLEATED RBC: 0 K/CU MM
TRICHOMONAS: ABNORMAL /HPF
UROBILINOGEN, URINE: 0.2 MG/DL (ref 0.2–1)
WBC # BLD: 12.3 K/CU MM (ref 4–10.5)
WBC UA: 8 /HPF (ref 0–2)

## 2023-05-10 PROCEDURE — 81001 URINALYSIS AUTO W/SCOPE: CPT

## 2023-05-10 PROCEDURE — 85025 COMPLETE CBC W/AUTO DIFF WBC: CPT

## 2023-05-10 PROCEDURE — 87086 URINE CULTURE/COLONY COUNT: CPT

## 2023-05-10 PROCEDURE — 80048 BASIC METABOLIC PNL TOTAL CA: CPT

## 2023-05-10 PROCEDURE — 51798 US URINE CAPACITY MEASURE: CPT

## 2023-05-10 PROCEDURE — 99223 1ST HOSP IP/OBS HIGH 75: CPT | Performed by: INTERNAL MEDICINE

## 2023-05-10 PROCEDURE — 2580000003 HC RX 258: Performed by: NURSE PRACTITIONER

## 2023-05-10 PROCEDURE — 96361 HYDRATE IV INFUSION ADD-ON: CPT

## 2023-05-10 PROCEDURE — 99284 EMERGENCY DEPT VISIT MOD MDM: CPT

## 2023-05-10 PROCEDURE — 96360 HYDRATION IV INFUSION INIT: CPT

## 2023-05-10 RX ORDER — 0.9 % SODIUM CHLORIDE 0.9 %
1000 INTRAVENOUS SOLUTION INTRAVENOUS ONCE
Status: COMPLETED | OUTPATIENT
Start: 2023-05-10 | End: 2023-05-10

## 2023-05-10 RX ADMIN — SODIUM CHLORIDE 1000 ML: 9 INJECTION, SOLUTION INTRAVENOUS at 17:47

## 2023-05-10 NOTE — TELEPHONE ENCOUNTER
Called and gave info. He does have appt next month with Dr. Forrest Garcia. I advised he try calling Dr. Eric Nova office to see if they can get him into cardio sooner.

## 2023-05-10 NOTE — ED NOTES
[address] [facility] paged Dr Rakel Gaona  05/10/23 Christine Gillespie returned call      Ap Jewell  05/10/23 [id number]

## 2023-05-10 NOTE — DISCHARGE INSTRUCTIONS
Stop taking effient 7 days prior to your scheduled urology procedure. After your procedure is over and you have been cleared to resume your medication by urology, you will be switched to plavix (clopidogrel). Please contact your cardiology office for this prescription.

## 2023-05-10 NOTE — ED NOTES
1821 called Camila KLEIN taking calls for [facility]     Aletha Roe  05/10/23 1821

## 2023-05-10 NOTE — ED PROVIDER NOTES
outpatient records. It appears that the patient's last cardiac cath with intervention was in March 2016 where he had a PCI to the RCA. He was placed on Effient at that time. Reviewed CT from recent visit to [facility] in April 2023:  1. Bilateral nephrolithiasis including a 1.3 cm calculus in the right renal pelvis. No hydronephrosis. 2.  There is a 1.9 cm bladder calculus. 3.  Prostatomegaly. Bladder wall thickening, likely secondary to chronic outlet obstruction. 4.  Ancillary findings discussed above. In brief, patient presented for evaluation of worsening urinary symptoms including urgency, frequency, nocturia, incontinence, hematuria and lower abdominal pain in the setting of a known bladder stone. Patient expresses frustration and wants to see if he can expedite cardiology clearance so that he is able to have the lithotripsy with urology. He states he is having difficulty working because he has to urinate so frequently and is losing control of his urine. He has not had any symptoms concerning for infection. Laboratory studies today including a BMP and CBC were significant for mild leukocytosis with a WBC of 12.3. Urinalysis does not appear concerning for infection as there were only 8 WBC and no bacteria on the microscopy. Urine culture was ordered. Consulted with urology and cardiology as noted above. Patient will hold Effient in preparation for urology procedure. Will be contacted tomorrow by urology office to schedule his lithotripsy and TURP. I am the Primary Clinician of Record. CLINICAL IMPRESSION      1. Hematuria, unspecified type    2. Bladder stone          DISPOSITION/PLAN   DISPOSITION        PATIENT REFERREDTO:  No follow-up provider specified.     DISCHARGE MEDICATIONS:  New Prescriptions    No medications on file       DISCONTINUED MEDICATIONS:  Discontinued Medications    No medications on file              (Please note that portions ofthis note were completed

## 2023-05-11 LAB
CULTURE: NORMAL
Lab: NORMAL
SPECIMEN: NORMAL

## 2023-05-11 NOTE — CONSULTS
CARDIOLOGY CONSULT NOTE   Reason for consultation:  anti platelet management     Referring physician:  No admitting provider for patient encounter. Primary care physician: Christiano Agarwal PA-C      Dear  Dr. Elizabeth Romero admitting provider for patient encounter. Thanks for the consult. Chief Complaints :  Chief Complaint   Patient presents with    Bladder Problem     Reports he has a bladder stone that no one will take care of for him. History of present illness:Lionel is a 70 y. o.year old who presents with history of bladder stone he was supposed to have a procedure done by urology but was not done as he is on antiplatelet agents he has history of coronary artery disease s/p RCA stent in 2017 he also has history of peripheral arterial disease s/p bilateral iliac stents he has no active chest pain or shortness of breath he is supposed to have bladder procedure done cardiology was asked to evaluate him for antiplatelet management currently denies any chest pain shortness of breath or ankle swelling    Past medical history:    has a past medical history of Abnormal angiogram, Chest pain, Decreased testosterone level, Diabetes mellitus ([address] H/O cardiovascular stress test, H/O Doppler ultrasound, H/O heart artery stent, H/O percutaneous left heart catheterization, H/O percutaneous left heart catheterization, H/O percutaneous left heart catheterization, H/O unstable angina, History of Doppler ultrasound, Hx of myocardial infarction, Hyperlipidemia, Hypertension, Kidney stone, Lower ext. Arterial Doppler, PAD (peripheral artery disease) ([address] PAOD (peripheral arterial occlusive disease) ([address] S/P angioplasty with stent, Smoker, Status post percutaneous transluminal coronary angioplasty, and STEMI (ST elevation myocardial infarction) ([address] Past surgical history:   has a past surgical history that includes Cardiac catheterization (3/1/15); Coronary angioplasty with stent (3/1/15);  Cardiac catheterization

## 2023-06-05 ENCOUNTER — HOSPITAL ENCOUNTER (OUTPATIENT)
Age: 63
Discharge: HOME | End: 2023-06-05
Payer: MEDICARE

## 2023-06-05 ENCOUNTER — OFFICE VISIT (OUTPATIENT)
Dept: CARDIOLOGY CLINIC | Age: 63
End: 2023-06-05
Payer: MEDICARE

## 2023-06-05 VITALS
DIASTOLIC BLOOD PRESSURE: 80 MMHG | HEART RATE: 48 BPM | BODY MASS INDEX: 24.65 KG/M2 | SYSTOLIC BLOOD PRESSURE: 130 MMHG | HEIGHT: 73 IN | WEIGHT: 186 LBS | OXYGEN SATURATION: 96 %

## 2023-06-05 DIAGNOSIS — I10 PRIMARY HYPERTENSION: ICD-10-CM

## 2023-06-05 DIAGNOSIS — I25.10 CORONARY ARTERY DISEASE INVOLVING NATIVE CORONARY ARTERY OF NATIVE HEART WITHOUT ANGINA PECTORIS: ICD-10-CM

## 2023-06-05 DIAGNOSIS — I25.10 CORONARY ARTERY DISEASE INVOLVING NATIVE CORONARY ARTERY OF NATIVE HEART WITHOUT ANGINA PECTORIS: Primary | ICD-10-CM

## 2023-06-05 DIAGNOSIS — I50.22 CHRONIC SYSTOLIC (CONGESTIVE) HEART FAILURE (HCC): ICD-10-CM

## 2023-06-05 LAB
ALBUMIN SERPL-MCNC: 4.6 GM/DL (ref 3.4–5)
ALP BLD-CCNC: 99 IU/L (ref 40–129)
ALT SERPL-CCNC: 14 U/L (ref 10–40)
AST SERPL-CCNC: 12 IU/L (ref 15–37)
BILIRUB SERPL-MCNC: 0.5 MG/DL (ref 0–1)
BILIRUBIN DIRECT: 0.2 MG/DL (ref 0–0.3)
BILIRUBIN, INDIRECT: 0.3 MG/DL (ref 0–0.7)
CHOLEST SERPL-MCNC: 253 MG/DL
HDLC SERPL-MCNC: 36 MG/DL
LDLC SERPL CALC-MCNC: 183 MG/DL
TOTAL PROTEIN: 7.1 GM/DL (ref 6.4–8.2)
TRIGL SERPL-MCNC: 170 MG/DL

## 2023-06-05 PROCEDURE — 80076 HEPATIC FUNCTION PANEL: CPT

## 2023-06-05 PROCEDURE — 3075F SYST BP GE 130 - 139MM HG: CPT | Performed by: INTERNAL MEDICINE

## 2023-06-05 PROCEDURE — 1123F ACP DISCUSS/DSCN MKR DOCD: CPT | Performed by: INTERNAL MEDICINE

## 2023-06-05 PROCEDURE — G8420 CALC BMI NORM PARAMETERS: HCPCS | Performed by: INTERNAL MEDICINE

## 2023-06-05 PROCEDURE — 99214 OFFICE O/P EST MOD 30 MIN: CPT | Performed by: INTERNAL MEDICINE

## 2023-06-05 PROCEDURE — 3079F DIAST BP 80-89 MM HG: CPT | Performed by: INTERNAL MEDICINE

## 2023-06-05 PROCEDURE — 3017F COLORECTAL CA SCREEN DOC REV: CPT | Performed by: INTERNAL MEDICINE

## 2023-06-05 PROCEDURE — 4004F PT TOBACCO SCREEN RCVD TLK: CPT | Performed by: INTERNAL MEDICINE

## 2023-06-05 PROCEDURE — G8427 DOCREV CUR MEDS BY ELIG CLIN: HCPCS | Performed by: INTERNAL MEDICINE

## 2023-06-05 PROCEDURE — 80061 LIPID PANEL: CPT

## 2023-06-05 PROCEDURE — 36415 COLL VENOUS BLD VENIPUNCTURE: CPT

## 2023-06-05 PROCEDURE — 93000 ELECTROCARDIOGRAM COMPLETE: CPT | Performed by: INTERNAL MEDICINE

## 2023-06-05 NOTE — PATIENT INSTRUCTIONS
**It is YOUR responsibilty to bring medication bottles and/or updated medication list to [address] [place] [address]. This will allow us to better serve you and all your healthcare needs**    [facility] Locations - No appointment necessary. Sites open Monday to Friday. Call your preferred location for test preparation, business   hours and other information you need. [employer]. [address] [place], [address] [facility]  Phone: [phone] [address] [place]  [address] [facility]  [address] [place], [address]  Phone: [phone]       Please be informed that if you contact our office outside of normal business hours the physician on call cannot help with any scheduling or rescheduling issues, procedure instruction questions or any type of medication issue. We advise you for any urgent/emergency that you go to the nearest emergency room! PLEASE CALL OUR OFFICE DURING NORMAL BUSINESS HOURS    Monday - Friday   8 am to 5 pm    [address]: [phone]    [place]:  [phone]    Thank you for allowing us to care for you today! We want to ensure we can follow your treatment plan and we strive to give you the best outcomes and experience possible. If you ever have a life threatening emergency and call 911 - for an ambulance (EMS)   Our providers can only care for you at:   [facility] or [facility]. Even if you have someone take you or you drive yourself we can only care for you in a [facility] facility. Our providers are not setup at the other healthcare locations!

## 2023-06-08 NOTE — PROGRESS NOTES
Ivette Carter MD        OFFICE  FOLLOWUP NOTE    Chief complaints: patient is here for management of CAD,PAD, DM, HTN, DYSLPIDEMIA    Subjective: patient feels better, claudications=,no chest pain, no shortness of breath, no dizziness, no palpitations    HPI Jenny Campbell is a 70 y. o.year old who  has a past medical history of Abnormal angiogram, Chest pain, Decreased testosterone level, Diabetes mellitus ([facility] [address] H/O cardiovascular stress test, H/O Doppler ultrasound, H/O heart artery stent, H/O percutaneous left heart catheterization, H/O percutaneous left heart catheterization, H/O percutaneous left heart catheterization, H/O unstable angina, History of Doppler ultrasound, Hx of myocardial infarction, Hyperlipidemia, Hypertension, Kidney stone, Lower ext. Arterial Doppler, PAD (peripheral artery disease) ([facility] [address] PAOD (peripheral arterial occlusive disease) ([facility] [address] S/P angioplasty with stent, Smoker, Status post percutaneous transluminal coronary angioplasty, and STEMI (ST elevation myocardial infarction) ([facility] [address] and presents for management of CAD, DM, HTN, AFIB, which are well controlled      Current Outpatient Medications   Medication Sig Dispense Refill    lisinopril (PRINIVIL;ZESTRIL) 20 MG tablet TAKE 1 TABLET BY MOUTH DAILY 90 tablet 1    metoprolol tartrate (LOPRESSOR) 25 MG tablet Take 1 tablet by mouth 2 times daily 180 tablet 0    metFORMIN (GLUCOPHAGE) 1000 MG tablet Take 1 tablet by mouth 2 times daily (with meals) 60 tablet 5    prasugrel (EFFIENT) 10 MG TABS TAKE 1 TABLET BY MOUTH DAILY 90 tablet 1    amLODIPine (NORVASC) 10 MG tablet TAKE 1 TABLET BY MOUTH DAILY 30 tablet 5    pravastatin (PRAVACHOL) 40 MG tablet Take 1 tablet by mouth daily 30 tablet 5    aspirin 81 MG EC tablet Take 1 tablet by mouth daily.  30 tablet 3    Evolocumab (REPATHA) SOSY syringe Inject 3 mLs into the skin every 30 days (Patient not taking: Reported on 6/5/2023) 3 mL 5    Insulin Glargine, 2 Unit Dial, (TOUJEO MAX
Oriented - self; Oriented - place; Oriented - time

## 2023-06-19 ENCOUNTER — PROCEDURE VISIT (OUTPATIENT)
Dept: CARDIOLOGY CLINIC | Age: 63
End: 2023-06-19
Payer: MEDICARE

## 2023-06-19 ENCOUNTER — TELEPHONE (OUTPATIENT)
Dept: CARDIOLOGY CLINIC | Age: 63
End: 2023-06-19

## 2023-06-19 DIAGNOSIS — I73.9 CLAUDICATION (HCC): Primary | ICD-10-CM

## 2023-06-19 DIAGNOSIS — I50.22 CHRONIC SYSTOLIC (CONGESTIVE) HEART FAILURE (HCC): ICD-10-CM

## 2023-06-19 DIAGNOSIS — I25.10 CORONARY ARTERY DISEASE INVOLVING NATIVE CORONARY ARTERY OF NATIVE HEART WITHOUT ANGINA PECTORIS: ICD-10-CM

## 2023-06-19 DIAGNOSIS — I10 PRIMARY HYPERTENSION: ICD-10-CM

## 2023-06-19 PROCEDURE — 93925 LOWER EXTREMITY STUDY: CPT | Performed by: INTERNAL MEDICINE

## 2023-06-19 PROCEDURE — 93922 UPR/L XTREMITY ART 2 LEVELS: CPT | Performed by: INTERNAL MEDICINE

## 2023-06-19 NOTE — TELEPHONE ENCOUNTER
Echo: 6/14/23   Left ventricular function and size is normal, EF is estimated at 55-60%. Moderate left ventricular hypertrophy. E/A reversal; indeterminate diastolic function. No regional wall motion abnormalities were detected. No significant valvular disease noted. No evidence of pericardial effusion    NM:6/14/23  Supervising physician Dr. Chioma Teresa . ABNOMAL stress test, mildly depressed LVEF,   increased EDV   Myocardial perfusion scan shows small size, moderate intensity, reversible   perfusion defect in inferior wall.       Recommendation   OFFICE VISIT TO DISCUSS RESULTS    Patient verbally understood and wants to keep his appointment as is

## 2023-06-23 ENCOUNTER — TELEPHONE (OUTPATIENT)
Dept: CARDIOLOGY CLINIC | Age: 63
End: 2023-06-23

## 2023-06-23 NOTE — TELEPHONE ENCOUNTER
ARTERIAL DOPPLER:6/19/23   Summary      Possible 50-99% stenosis of the right Mid SFA. No evidence of significant occlusive arterial disease of the left lower   extremtiy. Right ABIs show Moderate peripheral arterial disease. The Left NOLAN shows Mild peripheral arterial disease.       Recommendations      OFFICE visit to discuss results    Next appt: 7/3/23 @ 8:10am    Patient verbally understood 6

## 2023-07-03 ENCOUNTER — OFFICE VISIT (OUTPATIENT)
Dept: CARDIOLOGY CLINIC | Age: 63
End: 2023-07-03
Payer: MEDICARE

## 2023-07-03 VITALS
BODY MASS INDEX: 24.78 KG/M2 | WEIGHT: 187 LBS | HEIGHT: 73 IN | DIASTOLIC BLOOD PRESSURE: 70 MMHG | SYSTOLIC BLOOD PRESSURE: 118 MMHG | HEART RATE: 63 BPM

## 2023-07-03 DIAGNOSIS — R07.89 OTHER CHEST PAIN: ICD-10-CM

## 2023-07-03 DIAGNOSIS — I25.10 CORONARY ARTERY DISEASE INVOLVING NATIVE CORONARY ARTERY OF NATIVE HEART WITHOUT ANGINA PECTORIS: ICD-10-CM

## 2023-07-03 DIAGNOSIS — I73.9 CLAUDICATION (HCC): ICD-10-CM

## 2023-07-03 DIAGNOSIS — I50.22 CHRONIC SYSTOLIC (CONGESTIVE) HEART FAILURE (HCC): ICD-10-CM

## 2023-07-03 DIAGNOSIS — I10 PRIMARY HYPERTENSION: Primary | ICD-10-CM

## 2023-07-03 PROCEDURE — 3017F COLORECTAL CA SCREEN DOC REV: CPT | Performed by: INTERNAL MEDICINE

## 2023-07-03 PROCEDURE — 3074F SYST BP LT 130 MM HG: CPT | Performed by: INTERNAL MEDICINE

## 2023-07-03 PROCEDURE — 4004F PT TOBACCO SCREEN RCVD TLK: CPT | Performed by: INTERNAL MEDICINE

## 2023-07-03 PROCEDURE — 99214 OFFICE O/P EST MOD 30 MIN: CPT | Performed by: INTERNAL MEDICINE

## 2023-07-03 PROCEDURE — G8420 CALC BMI NORM PARAMETERS: HCPCS | Performed by: INTERNAL MEDICINE

## 2023-07-03 PROCEDURE — 1123F ACP DISCUSS/DSCN MKR DOCD: CPT | Performed by: INTERNAL MEDICINE

## 2023-07-03 PROCEDURE — G8427 DOCREV CUR MEDS BY ELIG CLIN: HCPCS | Performed by: INTERNAL MEDICINE

## 2023-07-03 PROCEDURE — 3078F DIAST BP <80 MM HG: CPT | Performed by: INTERNAL MEDICINE

## 2023-07-03 NOTE — PROGRESS NOTES
Lab Results   Component Value Date    CHOL 253 (H) 06/05/2023    TRIG 170 (H) 06/05/2023    HDL 36 (L) 06/05/2023    LDLCALC 183 (H) 06/05/2023    LDLDIRECT 147 (H) 07/27/2016     Lab Results   Component Value Date    ALT 14 06/05/2023    AST 12 (L) 06/05/2023     TSH:  No results found for: TSH    Impression:  Jose Ko is a 70 y. o.year old who  has a past medical history of Abnormal angiogram, Chest pain, Decreased testosterone level, Diabetes mellitus ([address]), H/O cardiovascular stress test, H/O Doppler ultrasound, H/O heart artery stent, H/O percutaneous left heart catheterization, H/O percutaneous left heart catheterization, H/O percutaneous left heart catheterization, H/O unstable angina, History of Doppler ultrasound, Hx of myocardial infarction, Hyperlipidemia, Hypertension, Kidney stone, Lower ext. Arterial Doppler, PAD (peripheral artery disease) ([address]), PAOD (peripheral arterial occlusive disease) ([address]), S/P angioplasty with stent, Smoker, Status post percutaneous transluminal coronary angioplasty, and STEMI (ST elevation myocardial infarction) ([address]  and presents with     Plan:  CAD: inferior wall ischemia noted,  LHC scheduled,risk and benefit explained and consent obtained stable, will get Continue aspirin and effient continue  ACEinhibitors, DC betablockers DUE TO BRADYCARDIA, recommend either levqio or praluent , refer to [facility] [employer] [facility] [place] , echo is normal  DM: stable continue metformin and insulin  PAD: intermittent claudication, has rt iliac stent, arterial dopple suggested right SFA mid segment stenosis, will get arterial doppler  Kidney stone and bladder stone: recommend to get urology to get stone lithotripsy  Dyslipidemia: he has stopped statins, will refer to heart [employer] research [facility] [place], last LDL is 171 in 1/22, will recheck lipids  HTN: home blood pressure 143-169/95-85, he can have masked hypertension, recommend to get home blood pressure cuff here calibrabrate here, stable,

## 2023-07-21 DIAGNOSIS — E11.65 UNCONTROLLED TYPE 2 DIABETES MELLITUS WITH HYPERGLYCEMIA (HCC): ICD-10-CM

## 2023-07-25 RX ORDER — INSULIN GLARGINE 300 U/ML
20 INJECTION, SOLUTION SUBCUTANEOUS NIGHTLY
Qty: 6 ML | Refills: 2 | Status: SHIPPED | OUTPATIENT
Start: 2023-07-25 | End: 2023-10-23

## 2023-07-26 ENCOUNTER — TELEPHONE (OUTPATIENT)
Dept: CARDIOLOGY CLINIC | Age: 63
End: 2023-07-26

## 2023-07-26 NOTE — TELEPHONE ENCOUNTER
Cardiologist: Dr. Rosa Elena Sena  Surgeon: Dr. Aftab Haskins  Surgery: [facility] surgery  Anesthesia: General  Date: Pending  FAX# [phone]  # [phone]    Last OV 7/3/2023 w/Nacho    CAD: inferior wall ischemia noted,  LHC scheduled,risk and benefit explained and consent obtained stable, will get Continue aspirin and effient continue  ACEinhibitors, DC betablockers DUE TO BRADYCARDIA, recommend either levqio or praluent , refer to [facility] , echo is normal  DM: stable continue metformin and insulin  PAD: intermittent claudication, has rt iliac stent, arterial dopple suggested right SFA mid segment stenosis, will get arterial doppler  Kidney stone and bladder stone: recommend to get urology to get stone lithotripsy  Dyslipidemia: he has stopped statins, will refer to [facility], last LDL is 171 in 1/22, will recheck lipids  HTN: home blood pressure 143-169/[id number], he can have masked hypertension, recommend to get home blood pressure cuff here calibrabrate here, stable, continue norvasc and lisinopril medicatons  All labs, medications and tests reviewed, continue all other medications of all above medical condition listed as is. Last EKG- 6/5/2023        NM-6/14/2023  ABNOMAL stress test, mildly depressed LVEF,   increased EDV   Myocardial perfusion scan shows small size, moderate intensity, reversible   perfusion defect in inferior wall. Echo- 6/14/2023   Left ventricular function and size is normal, EF is estimated at 55-60%. Moderate left ventricular hypertrophy. E/A reversal; indeterminate diastolic function. No regional wall motion abnormalities were detected. No significant valvular disease noted.    No evidence of pericardial effusion      Cath- 3/2016        Effient      Aspirin

## 2023-08-02 ENCOUNTER — TELEPHONE (OUTPATIENT)
Dept: CARDIOLOGY CLINIC | Age: 63
End: 2023-08-02

## 2023-08-02 DIAGNOSIS — Z01.810 PRE-OPERATIVE CARDIOVASCULAR EXAMINATION: Primary | ICD-10-CM

## 2023-08-02 NOTE — TELEPHONE ENCOUNTER
[address]ianca [address] WITH POSSIBLE PERCUTANEOUS CORONARY INTERVENTION      Patient Name: David Izaguirre   : 1960  MRN# [id number]    Date of Procedure: 8/15/23 Time: 10:30am Arrival Time: 8:30am    The catheterization and angiogram are usually outpatient procedures, however if stenting is needed you may need to stay overnight. You will need to arrive at the hospital two hours before the procedure. You will go to registration in the main lobby. You will need to arrange for someone to drive you home. HOSPITAL:  [place]      X   If you have received orders for blood work and or a chest x-ray, please have them done on assigned date at [facility], [facility], or [facility].     X Please do not have anything by mouth after midnight prior to or 8 hours before the procedure. X You may take your medications with a sip of water in the morning of your procedure or take them with you to the hospital             X If you are taking Metformin (Glucophage) or any medication containing Metformin (Glucophage) you must hold this medication the day before the procedure 23 , the day of your procedure and two days after your procedure. You may restart Metformin two days after your procedure on 23. X If you take Viagra (Sildenafil)  you will need to hold it for 3 days before your procedure.

## 2023-08-02 NOTE — TELEPHONE ENCOUNTER
Pt notified and confirmed procedure for 8/15/23 @1030am, arrival time 830am. Instruction call scheduled for 8/10/23 @930am.

## 2023-08-10 ENCOUNTER — HOSPITAL ENCOUNTER (OUTPATIENT)
Dept: GENERAL RADIOLOGY | Age: 63
Discharge: HOME | End: 2023-08-10
Payer: MEDICARE

## 2023-08-10 ENCOUNTER — HOSPITAL ENCOUNTER (OUTPATIENT)
Age: 63
Discharge: HOME | End: 2023-08-10
Payer: MEDICARE

## 2023-08-10 DIAGNOSIS — Z01.810 PRE-OPERATIVE CARDIOVASCULAR EXAMINATION: ICD-10-CM

## 2023-08-10 LAB
ABO/RH: NORMAL
ANION GAP SERPL CALCULATED.3IONS-SCNC: 13 MMOL/L (ref 4–16)
ANTIBODY SCREEN: NEGATIVE
BUN SERPL-MCNC: 20 MG/DL (ref 6–23)
CALCIUM SERPL-MCNC: 9.6 MG/DL (ref 8.3–10.6)
CHLORIDE BLD-SCNC: 100 MMOL/L (ref 99–110)
CO2: 24 MMOL/L (ref 21–32)
COMMENT: NORMAL
CREAT SERPL-MCNC: 1.2 MG/DL (ref 0.9–1.3)
GFR SERPL CREATININE-BSD FRML MDRD: >60 ML/MIN/1.73M2
GLUCOSE SERPL-MCNC: 208 MG/DL (ref 70–99)
HCT VFR BLD CALC: 42.8 % (ref 42–52)
HEMOGLOBIN: 13.9 GM/DL (ref 13.5–18)
MCH RBC QN AUTO: 28.8 PG (ref 27–31)
MCHC RBC AUTO-ENTMCNC: 32.5 % (ref 32–36)
MCV RBC AUTO: 88.6 FL (ref 78–100)
PDW BLD-RTO: 13.1 % (ref 11.7–14.9)
PLATELET # BLD: 299 K/CU MM (ref 140–440)
PMV BLD AUTO: 11.5 FL (ref 7.5–11.1)
POTASSIUM SERPL-SCNC: 4.7 MMOL/L (ref 3.5–5.1)
RBC # BLD: 4.83 M/CU MM (ref 4.6–6.2)
SODIUM BLD-SCNC: 137 MMOL/L (ref 135–145)
WBC # BLD: 9.1 K/CU MM (ref 4–10.5)

## 2023-08-10 PROCEDURE — 36415 COLL VENOUS BLD VENIPUNCTURE: CPT

## 2023-08-10 PROCEDURE — 86900 BLOOD TYPING SEROLOGIC ABO: CPT

## 2023-08-10 PROCEDURE — 80048 BASIC METABOLIC PNL TOTAL CA: CPT

## 2023-08-10 PROCEDURE — 85027 COMPLETE CBC AUTOMATED: CPT

## 2023-08-10 PROCEDURE — 86901 BLOOD TYPING SEROLOGIC RH(D): CPT

## 2023-08-10 PROCEDURE — 86850 RBC ANTIBODY SCREEN: CPT

## 2023-08-10 PROCEDURE — 71046 X-RAY EXAM CHEST 2 VIEWS: CPT

## 2023-08-14 ENCOUNTER — TELEPHONE (OUTPATIENT)
Dept: CARDIOLOGY CLINIC | Age: 63
End: 2023-08-14

## 2023-08-15 ENCOUNTER — HOSPITAL ENCOUNTER (OUTPATIENT)
Dept: CARDIAC CATH/INVASIVE PROCEDURES | Age: 63
Discharge: HOME | End: 2023-08-15
Attending: INTERNAL MEDICINE | Admitting: INTERNAL MEDICINE
Payer: MEDICARE

## 2023-08-15 VITALS
TEMPERATURE: 96.4 F | OXYGEN SATURATION: 98 % | RESPIRATION RATE: 19 BRPM | WEIGHT: 187 LBS | DIASTOLIC BLOOD PRESSURE: 95 MMHG | HEIGHT: 73 IN | SYSTOLIC BLOOD PRESSURE: 164 MMHG | HEART RATE: 54 BPM | BODY MASS INDEX: 24.78 KG/M2

## 2023-08-15 PROBLEM — R94.39 ABNORMAL STRESS TEST: Status: ACTIVE | Noted: 2023-08-15

## 2023-08-15 LAB — GLUCOSE BLD-MCNC: 170 MG/DL (ref 70–99)

## 2023-08-15 PROCEDURE — 82962 GLUCOSE BLOOD TEST: CPT

## 2023-08-15 PROCEDURE — 36245 INS CATH ABD/L-EXT ART 1ST: CPT

## 2023-08-15 PROCEDURE — 75716 ARTERY X-RAYS ARMS/LEGS: CPT | Performed by: INTERNAL MEDICINE

## 2023-08-15 PROCEDURE — C1725 CATH, TRANSLUMIN NON-LASER: HCPCS

## 2023-08-15 PROCEDURE — 6370000000 HC RX 637 (ALT 250 FOR IP)

## 2023-08-15 PROCEDURE — 6360000004 HC RX CONTRAST MEDICATION

## 2023-08-15 PROCEDURE — 2580000003 HC RX 258: Performed by: INTERNAL MEDICINE

## 2023-08-15 PROCEDURE — 75630 X-RAY AORTA LEG ARTERIES: CPT | Performed by: INTERNAL MEDICINE

## 2023-08-15 PROCEDURE — C1887 CATHETER, GUIDING: HCPCS

## 2023-08-15 PROCEDURE — 2709999900 HC NON-CHARGEABLE SUPPLY

## 2023-08-15 PROCEDURE — C1894 INTRO/SHEATH, NON-LASER: HCPCS

## 2023-08-15 PROCEDURE — 6360000002 HC RX W HCPCS

## 2023-08-15 PROCEDURE — C1769 GUIDE WIRE: HCPCS

## 2023-08-15 PROCEDURE — 93458 L HRT ARTERY/VENTRICLE ANGIO: CPT

## 2023-08-15 PROCEDURE — C1874 STENT, COATED/COV W/DEL SYS: HCPCS

## 2023-08-15 PROCEDURE — 36246 INS CATH ABD/L-EXT ART 2ND: CPT

## 2023-08-15 PROCEDURE — 93458 L HRT ARTERY/VENTRICLE ANGIO: CPT | Performed by: INTERNAL MEDICINE

## 2023-08-15 PROCEDURE — C9600 PERC DRUG-EL COR STENT SING: HCPCS

## 2023-08-15 PROCEDURE — 6370000000 HC RX 637 (ALT 250 FOR IP): Performed by: INTERNAL MEDICINE

## 2023-08-15 PROCEDURE — 92928 PRQ TCAT PLMT NTRAC ST 1 LES: CPT | Performed by: INTERNAL MEDICINE

## 2023-08-15 RX ORDER — SODIUM CHLORIDE 9 MG/ML
INJECTION, SOLUTION INTRAVENOUS CONTINUOUS
Status: DISCONTINUED | OUTPATIENT
Start: 2023-08-15 | End: 2023-08-15 | Stop reason: HOSPADM

## 2023-08-15 RX ORDER — DIPHENHYDRAMINE HCL 25 MG
25 TABLET ORAL ONCE
Status: COMPLETED | OUTPATIENT
Start: 2023-08-15 | End: 2023-08-15

## 2023-08-15 RX ORDER — DIAZEPAM 5 MG/1
5 TABLET ORAL ONCE
Status: COMPLETED | OUTPATIENT
Start: 2023-08-15 | End: 2023-08-15

## 2023-08-15 RX ADMIN — DIPHENHYDRAMINE HYDROCHLORIDE 25 MG: 25 TABLET ORAL at 08:35

## 2023-08-15 RX ADMIN — SODIUM CHLORIDE: 9 INJECTION, SOLUTION INTRAVENOUS at 08:35

## 2023-08-15 RX ADMIN — DIAZEPAM 5 MG: 5 TABLET ORAL at 08:35

## 2023-08-15 NOTE — TELEPHONE ENCOUNTER
EDWARD WOULD ALSO LIKE TO DO A BILATERAL ANGIOGRAM WHILE RADHA IS IN [facility] PROCEDURE, THIS WILL ALSO NOT REQUIRE AUTHORIZATION

## 2023-08-15 NOTE — PROGRESS NOTES
Discharge instructions reviewed. Questions answered and patient verbalized understanding. PIV removed. Radial site WNL.

## 2023-08-15 NOTE — FLOWSHEET NOTE
Pt to pt  in wheelchair per volunteer for d/c home in private vehicle with friend.  Right radial site free of bleeding/hematoma at time of d/c

## 2023-08-17 ENCOUNTER — TELEPHONE (OUTPATIENT)
Dept: CARDIOLOGY CLINIC | Age: 63
End: 2023-08-17

## 2023-08-17 NOTE — TELEPHONE ENCOUNTER
Pt stop by asking about scheduling a PTA. Told pt that Dr. Elmer James is out of the office until Monday. I will ask Dr. Elmer James on Monday when he wants to schedule PTA.

## 2023-08-21 ENCOUNTER — TELEPHONE (OUTPATIENT)
Dept: CARDIOLOGY CLINIC | Age: 63
End: 2023-08-21

## 2023-08-21 NOTE — TELEPHONE ENCOUNTER
Patient given instructions over telephone on PTA OD R LOWER EXT for Dx: PAD. Procedure is scheduled for 8/25/23 @ 8 AM, w/arrival @ 6 AM, @ [facility]. DOES NOT NEED TO REPEAT PRE TESTING. PT will be in to sign consents. Patient advised to review instructions given. Patient was notified that procedure date or time could be changed due to an emergency. Patient voiced understanding.

## 2023-08-25 ENCOUNTER — HOSPITAL ENCOUNTER (OUTPATIENT)
Dept: CARDIAC CATH/INVASIVE PROCEDURES | Age: 63
Discharge: HOME | End: 2023-08-25
Attending: INTERNAL MEDICINE | Admitting: INTERNAL MEDICINE
Payer: MEDICARE

## 2023-08-25 VITALS
HEIGHT: 73 IN | HEART RATE: 56 BPM | DIASTOLIC BLOOD PRESSURE: 86 MMHG | RESPIRATION RATE: 19 BRPM | WEIGHT: 187 LBS | OXYGEN SATURATION: 98 % | BODY MASS INDEX: 24.78 KG/M2 | TEMPERATURE: 96.8 F | SYSTOLIC BLOOD PRESSURE: 152 MMHG

## 2023-08-25 LAB
ACTIVATED CLOTTING TIME, LOW RANGE: 241 SEC
ACTIVATED CLOTTING TIME, LOW RANGE: 276 SEC
GLUCOSE BLD-MCNC: 228 MG/DL (ref 70–99)

## 2023-08-25 PROCEDURE — 6360000004 HC RX CONTRAST MEDICATION

## 2023-08-25 PROCEDURE — C1894 INTRO/SHEATH, NON-LASER: HCPCS

## 2023-08-25 PROCEDURE — 6370000000 HC RX 637 (ALT 250 FOR IP): Performed by: INTERNAL MEDICINE

## 2023-08-25 PROCEDURE — 37224 PR REVSC OPN/PRG FEM/POP W/ANGIOPLASTY UNI: CPT | Performed by: INTERNAL MEDICINE

## 2023-08-25 PROCEDURE — 75710 ARTERY X-RAYS ARM/LEG: CPT

## 2023-08-25 PROCEDURE — C1725 CATH, TRANSLUMIN NON-LASER: HCPCS

## 2023-08-25 PROCEDURE — 6370000000 HC RX 637 (ALT 250 FOR IP)

## 2023-08-25 PROCEDURE — C1887 CATHETER, GUIDING: HCPCS

## 2023-08-25 PROCEDURE — 6360000002 HC RX W HCPCS

## 2023-08-25 PROCEDURE — 2709999900 HC NON-CHARGEABLE SUPPLY

## 2023-08-25 PROCEDURE — 75710 ARTERY X-RAYS ARM/LEG: CPT | Performed by: INTERNAL MEDICINE

## 2023-08-25 PROCEDURE — 85347 COAGULATION TIME ACTIVATED: CPT

## 2023-08-25 PROCEDURE — 37224 HC FEM POP TERRITORY PLASTY: CPT

## 2023-08-25 PROCEDURE — C1769 GUIDE WIRE: HCPCS

## 2023-08-25 PROCEDURE — 76937 US GUIDE VASCULAR ACCESS: CPT | Performed by: INTERNAL MEDICINE

## 2023-08-25 PROCEDURE — 82962 GLUCOSE BLOOD TEST: CPT

## 2023-08-25 RX ORDER — ASPIRIN 325 MG
325 TABLET, DELAYED RELEASE (ENTERIC COATED) ORAL DAILY
Status: DISCONTINUED | OUTPATIENT
Start: 2023-08-25 | End: 2023-08-25 | Stop reason: HOSPADM

## 2023-08-25 RX ORDER — SODIUM CHLORIDE 9 MG/ML
INJECTION, SOLUTION INTRAVENOUS CONTINUOUS
Status: DISCONTINUED | OUTPATIENT
Start: 2023-08-25 | End: 2023-08-25 | Stop reason: HOSPADM

## 2023-08-25 RX ORDER — PRASUGREL 10 MG/1
60 TABLET, FILM COATED ORAL DAILY
Status: DISCONTINUED | OUTPATIENT
Start: 2023-08-25 | End: 2023-08-25 | Stop reason: HOSPADM

## 2023-08-25 RX ORDER — INCLISIRAN 284 MG/1.5ML
284 INJECTION, SOLUTION SUBCUTANEOUS ONCE
Qty: 1.5 ML | Refills: 0 | Status: SHIPPED | OUTPATIENT
Start: 2023-08-25 | End: 2023-08-25

## 2023-08-25 RX ORDER — DIPHENHYDRAMINE HCL 25 MG
25 TABLET ORAL ONCE
Status: COMPLETED | OUTPATIENT
Start: 2023-08-25 | End: 2023-08-25

## 2023-08-25 RX ORDER — DIAZEPAM 5 MG/1
5 TABLET ORAL ONCE
Status: COMPLETED | OUTPATIENT
Start: 2023-08-25 | End: 2023-08-25

## 2023-08-25 RX ADMIN — DIAZEPAM 5 MG: 5 TABLET ORAL at 06:58

## 2023-08-25 RX ADMIN — DIPHENHYDRAMINE HYDROCHLORIDE 25 MG: 25 TABLET ORAL at 06:58

## 2023-08-25 RX ADMIN — Medication 325 MG: at 09:19

## 2023-08-25 RX ADMIN — PRASUGREL 60 MG: 10 TABLET, FILM COATED ORAL at 09:18

## 2023-08-25 NOTE — FLOWSHEET NOTE
Pt to pt  in wheelchair per volunteer for d/c home in private vehicle with friend.  Right pedal access site free of bleeding/hematoma at time of d/c and dressing remains dry and intact

## 2023-08-25 NOTE — DISCHARGE INSTRUCTIONS
Keep dressing on for 2 days    Watch for signs of infection    If bleeding occurs hold pressure for 10min, if bleedong would not stop after 10 min call 911    No swimming pools, hot tubs, or tub baths for 5 days

## 2023-08-25 NOTE — PROCEDURES
Peripheral Cath Diagnostic Procedure:arterial Access, Right Leg    Angiography: SFA Catheter [facility], ACCESS, Ultrasound Guidance, R    dorsalis pedis, Closure Devices, Hemostasis with Manual    Compression        Conclusions        Peripheral Procedure Description    under ultrasound guidance right arterial access was obtained and    5 f sheath was placed, vessel lumen was intact, flow was patent    and image was saved, after that glide advantage wire was    advanced into the right SFA and then trail blazer catheter was    advanced over it and position at the right proximal SFA and then    injected with contrast to get right lower extremity angiogram:    results        1-right SFA 99% mid segment stenosis    2- patent right PTA    3- PATENT RT Peroneal artery        DECISION was made to intervene on right SFA and then trail    blazer was exchanged with drug coating 5x150 mm balloon and the    position at the mid segment of right SFA and inflated for 3    mins.     balloon was removed and trail blazer cathter was advnaced again    over the glide advantge wire and position at the right proximal    SFA and injected with contrast which showed execellent results    of intervention, and patent below the knee blood vessels    estimated blood loss 5 cc

## 2023-08-25 NOTE — FLOWSHEET NOTE
Discharge instructions reviewed with patient per Marichuy Hernandez Voices understanding. Ambulated without difficulty.

## 2023-08-25 NOTE — H&P
KIT by Does not apply route        aspirin 81 MG EC tablet Take 1 tablet by mouth daily. 30 tablet 3    sildenafil (VIAGRA) 100 MG tablet Take 1 tablet by mouth as needed for Erectile Dysfunction (Patient not taking: Reported on 6/5/2023) 30 tablet 2    pravastatin (PRAVACHOL) 40 MG tablet Take 1 tablet by mouth daily (Patient not taking: Reported on 7/3/2023) 30 tablet 5      No current facility-administered medications for this visit. Allergies: Trulicity [dulaglutide]  Past Medical History           Past Medical History:   Diagnosis Date    Abnormal angiogram 4/15/15     100% left iliac instent restenosis, UnSuccessful PTA of rt iliac artery, PTA @ [facility]    Chest pain      Decreased testosterone level      Diabetes mellitus ([address])      H/O cardiovascular stress test 06/24/2019     Myocardial perfusion scan shows small size, moderate intensity, reversible perfusion defect in anterior wall. H/O Doppler ultrasound 3/12/15     Arterial doppler. Abnormal right leg NOLAN suggestive of moderate to severe stenosis in the area of internal iliac artery which appears to be completely stenotic. I would recommend right lower extremity angiography. H/O heart artery stent 3/17/16     3.0 X 22 MM & 3.0 X 18 MM sharmaine to RCA. H/O percutaneous left heart catheterization 3/1/15     100% LAD, 90% RCA, 100% R. iliac stenosis    H/O percutaneous left heart catheterization 3/16/15     95% mid & 70% prox RCA    H/O percutaneous left heart catheterization 3/17/16     RCA mid 80%,     H/O unstable angina      History of Doppler ultrasound 04/22/2016     Carotid-there is no flow limiting lesions BLT     Hx of myocardial infarction      Hyperlipidemia      Hypertension      Kidney stone      Lower ext.  Arterial Doppler 10/26/2016     The  study is normal    PAD (peripheral artery disease) ([facility])       100% rt iliac stenosis    PAOD (peripheral arterial occlusive disease) ([facility])      S/P angioplasty with stent 3/1/15     Successful lithotripsy  Dyslipidemia: he has stopped statins, [facility], last LDL is 171 in 1/22, will recheck lipids  HTN: home blood pressure 143-169/95-85, he can have masked hypertension, recommend to get home blood pressure cuff here calibrabrate here, stable, continue norvasc and lisinopril medicatons  All labs, medications and tests reviewed, continue all other medications of all above medical condition listed as is.

## 2023-09-05 DIAGNOSIS — N52.01 ERECTILE DYSFUNCTION DUE TO ARTERIAL INSUFFICIENCY: ICD-10-CM

## 2023-09-06 RX ORDER — PEN NEEDLE, DIABETIC 29 G X1/2"
NEEDLE, DISPOSABLE MISCELLANEOUS
Qty: 100 EACH | Refills: 5 | Status: SHIPPED | OUTPATIENT
Start: 2023-09-06

## 2023-09-06 RX ORDER — SILDENAFIL 100 MG/1
100 TABLET, FILM COATED ORAL PRN
Qty: 30 TABLET | Refills: 2 | Status: SHIPPED | OUTPATIENT
Start: 2023-09-06

## 2023-09-26 DIAGNOSIS — E11.9 TYPE 2 DIABETES MELLITUS WITHOUT COMPLICATION, WITH LONG-TERM CURRENT USE OF INSULIN (HCC): ICD-10-CM

## 2023-09-26 DIAGNOSIS — I10 ESSENTIAL HYPERTENSION: ICD-10-CM

## 2023-09-26 DIAGNOSIS — Z79.4 TYPE 2 DIABETES MELLITUS WITHOUT COMPLICATION, WITH LONG-TERM CURRENT USE OF INSULIN (HCC): ICD-10-CM

## 2023-09-26 LAB
ALBUMIN SERPL-MCNC: 4.6 G/DL (ref 3.4–5)
ALBUMIN/GLOB SERPL: 1.8 {RATIO} (ref 1.1–2.2)
ALP SERPL-CCNC: 100 U/L (ref 40–129)
ALT SERPL-CCNC: 17 U/L (ref 10–40)
ANION GAP SERPL CALCULATED.3IONS-SCNC: 15 MMOL/L (ref 3–16)
AST SERPL-CCNC: 14 U/L (ref 15–37)
BASOPHILS # BLD: 0 K/UL (ref 0–0.2)
BASOPHILS NFR BLD: 0.5 %
BILIRUB SERPL-MCNC: 0.5 MG/DL (ref 0–1)
BUN SERPL-MCNC: 19 MG/DL (ref 7–20)
CALCIUM SERPL-MCNC: 9.3 MG/DL (ref 8.3–10.6)
CHLORIDE SERPL-SCNC: 102 MMOL/L (ref 99–110)
CHOLEST SERPL-MCNC: 231 MG/DL (ref 0–199)
CO2 SERPL-SCNC: 23 MMOL/L (ref 21–32)
CREAT SERPL-MCNC: 1.1 MG/DL (ref 0.8–1.3)
CREAT UR-MCNC: 99.7 MG/DL (ref 39–259)
DEPRECATED RDW RBC AUTO: 13.9 % (ref 12.4–15.4)
EOSINOPHIL # BLD: 0.3 K/UL (ref 0–0.6)
EOSINOPHIL NFR BLD: 3.3 %
GFR SERPLBLD CREATININE-BSD FMLA CKD-EPI: >60 ML/MIN/{1.73_M2}
GLUCOSE SERPL-MCNC: 154 MG/DL (ref 70–99)
HCT VFR BLD AUTO: 44.5 % (ref 40.5–52.5)
HDLC SERPL-MCNC: 40 MG/DL (ref 40–60)
HGB BLD-MCNC: 14.9 G/DL (ref 13.5–17.5)
LDLC SERPL CALC-MCNC: 155 MG/DL
LYMPHOCYTES # BLD: 2.1 K/UL (ref 1–5.1)
LYMPHOCYTES NFR BLD: 24.2 %
MCH RBC QN AUTO: 29.7 PG (ref 26–34)
MCHC RBC AUTO-ENTMCNC: 33.6 G/DL (ref 31–36)
MCV RBC AUTO: 88.5 FL (ref 80–100)
MICROALBUMIN UR DL<=1MG/L-MCNC: 3.8 MG/DL
MICROALBUMIN/CREAT UR: 38.1 MG/G (ref 0–30)
MONOCYTES # BLD: 0.7 K/UL (ref 0–1.3)
MONOCYTES NFR BLD: 8.1 %
NEUTROPHILS # BLD: 5.5 K/UL (ref 1.7–7.7)
NEUTROPHILS NFR BLD: 63.9 %
PLATELET # BLD AUTO: 251 K/UL (ref 135–450)
PMV BLD AUTO: 10 FL (ref 5–10.5)
POTASSIUM SERPL-SCNC: 4.6 MMOL/L (ref 3.5–5.1)
PROT SERPL-MCNC: 7.1 G/DL (ref 6.4–8.2)
RBC # BLD AUTO: 5.03 M/UL (ref 4.2–5.9)
SODIUM SERPL-SCNC: 140 MMOL/L (ref 136–145)
TRIGL SERPL-MCNC: 181 MG/DL (ref 0–150)
VLDLC SERPL CALC-MCNC: 36 MG/DL
WBC # BLD AUTO: 8.6 K/UL (ref 4–11)

## 2023-09-27 LAB
EST. AVERAGE GLUCOSE BLD GHB EST-MCNC: 191.5 MG/DL
HBA1C MFR BLD: 8.3 %

## 2023-09-28 ENCOUNTER — OFFICE VISIT (OUTPATIENT)
Dept: FAMILY MEDICINE CLINIC | Age: 63
End: 2023-09-28
Payer: MEDICARE

## 2023-09-28 VITALS
DIASTOLIC BLOOD PRESSURE: 80 MMHG | HEART RATE: 62 BPM | SYSTOLIC BLOOD PRESSURE: 138 MMHG | HEIGHT: 73 IN | WEIGHT: 187.1 LBS | BODY MASS INDEX: 24.8 KG/M2 | OXYGEN SATURATION: 97 %

## 2023-09-28 DIAGNOSIS — I10 PRIMARY HYPERTENSION: ICD-10-CM

## 2023-09-28 DIAGNOSIS — E11.65 TYPE 2 DIABETES MELLITUS WITH HYPERGLYCEMIA, WITH LONG-TERM CURRENT USE OF INSULIN (HCC): ICD-10-CM

## 2023-09-28 DIAGNOSIS — I73.9 PAD (PERIPHERAL ARTERY DISEASE) (HCC): ICD-10-CM

## 2023-09-28 DIAGNOSIS — Z79.4 TYPE 2 DIABETES MELLITUS WITH HYPERGLYCEMIA, WITH LONG-TERM CURRENT USE OF INSULIN (HCC): ICD-10-CM

## 2023-09-28 DIAGNOSIS — N20.0 KIDNEY STONES: Primary | ICD-10-CM

## 2023-09-28 DIAGNOSIS — Z23 NEED FOR PNEUMOCOCCAL VACCINATION: ICD-10-CM

## 2023-09-28 DIAGNOSIS — E11.65 UNCONTROLLED TYPE 2 DIABETES MELLITUS WITH HYPERGLYCEMIA (HCC): ICD-10-CM

## 2023-09-28 DIAGNOSIS — I25.10 CORONARY ARTERY DISEASE INVOLVING NATIVE HEART, UNSPECIFIED VESSEL OR LESION TYPE, UNSPECIFIED WHETHER ANGINA PRESENT: ICD-10-CM

## 2023-09-28 PROCEDURE — 3075F SYST BP GE 130 - 139MM HG: CPT | Performed by: PHYSICIAN ASSISTANT

## 2023-09-28 PROCEDURE — 90677 PCV20 VACCINE IM: CPT | Performed by: PHYSICIAN ASSISTANT

## 2023-09-28 PROCEDURE — G8427 DOCREV CUR MEDS BY ELIG CLIN: HCPCS | Performed by: PHYSICIAN ASSISTANT

## 2023-09-28 PROCEDURE — G0009 ADMIN PNEUMOCOCCAL VACCINE: HCPCS | Performed by: PHYSICIAN ASSISTANT

## 2023-09-28 PROCEDURE — 99214 OFFICE O/P EST MOD 30 MIN: CPT | Performed by: PHYSICIAN ASSISTANT

## 2023-09-28 PROCEDURE — 3017F COLORECTAL CA SCREEN DOC REV: CPT | Performed by: PHYSICIAN ASSISTANT

## 2023-09-28 PROCEDURE — 1124F ACP DISCUSS-NO DSCNMKR DOCD: CPT | Performed by: PHYSICIAN ASSISTANT

## 2023-09-28 PROCEDURE — 3079F DIAST BP 80-89 MM HG: CPT | Performed by: PHYSICIAN ASSISTANT

## 2023-09-28 PROCEDURE — 3052F HG A1C>EQUAL 8.0%<EQUAL 9.0%: CPT | Performed by: PHYSICIAN ASSISTANT

## 2023-09-28 PROCEDURE — G8420 CALC BMI NORM PARAMETERS: HCPCS | Performed by: PHYSICIAN ASSISTANT

## 2023-09-28 PROCEDURE — 4004F PT TOBACCO SCREEN RCVD TLK: CPT | Performed by: PHYSICIAN ASSISTANT

## 2023-09-28 PROCEDURE — 2022F DILAT RTA XM EVC RTNOPTHY: CPT | Performed by: PHYSICIAN ASSISTANT

## 2023-09-28 RX ORDER — LISINOPRIL 20 MG/1
20 TABLET ORAL DAILY
Qty: 90 TABLET | Refills: 1 | Status: SHIPPED | OUTPATIENT
Start: 2023-09-28 | End: 2024-03-26

## 2023-09-28 ASSESSMENT — PATIENT HEALTH QUESTIONNAIRE - PHQ9
SUM OF ALL RESPONSES TO PHQ QUESTIONS 1-9: 0
1. LITTLE INTEREST OR PLEASURE IN DOING THINGS: 0
SUM OF ALL RESPONSES TO PHQ QUESTIONS 1-9: 0
2. FEELING DOWN, DEPRESSED OR HOPELESS: 0
SUM OF ALL RESPONSES TO PHQ9 QUESTIONS 1 & 2: 0
SUM OF ALL RESPONSES TO PHQ QUESTIONS 1-9: 0
SUM OF ALL RESPONSES TO PHQ QUESTIONS 1-9: 0

## 2023-09-28 NOTE — PROGRESS NOTES
9/28/2023    Aury Speaker    Chief Complaint   Patient presents with    Follow-up     6 month        HPI  History obtained from the patient. Maryam Gardner is a 70 y.o. male who presents today for 6 month follow up. Nephrolithiasis - Patient had a kidney stone diagnosed in April. His urologist told him he couldn't address it until he got cardiac clearance, so the patient went to his cardiologist. Terence Wang got rid of the bladder stone. They kept saying there's one big stone in you right kidney. \" He still has multiple stones on the left and small one in the right. CAD - Patient follows with cardiology, Dr. Chau Brooks. Patient states he's gotten another stent in his heart and a balloon angioplasty in his right leg. HLD - Patient cannot tolerate statins. He states that his cardiologist sent in a prescription for an alternative cholesterol medication, but it was denied by his insurance and over $4000 out-of-pocket. Type 2 Diabetes - Patient takes Metformin 1000 mg BID, insulin glargine 20 units nightly. Denies current home blood sugar monitoring. HTN - Well controlled on amlodipine 10 mg daily, lisinopril 20 mg daily. Specialists:  Cardiology (CAD, PAD) - Moy Sears MD  Urology (Nephrolithiasis) - Mary Sylvester MD at [facility]     Health Maintenance - Patient's care gaps include P20, LUNG CA SCREEN, AWV, DEPRESSION SCREEN. Next LUNG CANCER SCREENING  will be due: OVERDUE  Next [address] will be due: DUE SINCE 4/27/23 (5 year follow up with Dr. Shell Dimas due to colon poylps).     PHQ-9 Total Score: 0 (9/28/2023  8:05 AM)    Last Weight Metrics:      9/28/2023     8:09 AM 8/25/2023     6:50 AM 8/15/2023     8:26 AM 7/3/2023     8:14 AM 6/5/2023    10:26 AM 5/10/2023     3:09 PM 3/30/2023     8:11 AM   Weight Loss Metrics   Height 6' 1\" 6' 1\" 6' 1\" 6' 1\" 6' 1\"  6' 0\"   Weight - Scale 187 lbs 2 oz 187 lbs 187 lbs 187 lbs 186 lbs 169 lbs 188 lbs   BMI (Calculated) 24.7 kg/m2 24.7 kg/m2

## 2023-09-28 NOTE — PATIENT INSTRUCTIONS
Diabetes Monitoring Instructions    Increase your dose of insulin at night by 2 units every 2-3 nights. Check your Fasting Blood Glucose (FBG) every morning before breakfast.   Write these numbers down -- Keep a daily log of your fasting blood glucose (FBG). You can stop increasing your insulin dose once your FASTING blood glucose averages 120s or less. Your fasting blood glucose (FBG) GOAL is to average between 70 and 120. Call us right away or seek emergency medical attention if your blood glucose is ever ABOVE 300 or BELOW 60. LOW Blood Sugar  Know what to look for -- Symptoms of low blood sugar include: Sweating, weakness, fast heart rate, palpitations, tremor, feeling anxious or nervous, irritability, confusion, lightheadedness. If you develop these symptoms or your blood sugar is less than 80, you can drink 6 ounces of fruit juice, eat a teaspoon of honey or table sugar, or take 3-4 glucose tablets right away. HIGH Blood Sugar  Know what to look for -- Symptoms of high blood sugar include: Frequent urination, excessive thirst, nausea, vomiting, abdominal pain, rapid breathing, fatigue, lethargy, confusion. If you develop these symptoms, seek immediate medical attention. Next LUNG CANCER SCREENING  will be due: OVERDUE  Next [address] will be due: DUE SINCE 4/27/23 (5 year follow up with Dr. Sia Mckenzie due to colon poylps).

## 2023-10-10 ENCOUNTER — OFFICE VISIT (OUTPATIENT)
Dept: CARDIOLOGY CLINIC | Age: 63
End: 2023-10-10
Payer: MEDICARE

## 2023-10-10 VITALS
SYSTOLIC BLOOD PRESSURE: 138 MMHG | WEIGHT: 186 LBS | DIASTOLIC BLOOD PRESSURE: 78 MMHG | HEART RATE: 62 BPM | HEIGHT: 73 IN | OXYGEN SATURATION: 97 % | BODY MASS INDEX: 24.65 KG/M2

## 2023-10-10 DIAGNOSIS — I10 PRIMARY HYPERTENSION: Primary | ICD-10-CM

## 2023-10-10 DIAGNOSIS — I50.22 CHRONIC SYSTOLIC (CONGESTIVE) HEART FAILURE (HCC): ICD-10-CM

## 2023-10-10 DIAGNOSIS — I73.9 CLAUDICATION (HCC): ICD-10-CM

## 2023-10-10 DIAGNOSIS — R94.31 ABNORMAL EKG: ICD-10-CM

## 2023-10-10 DIAGNOSIS — R07.89 OTHER CHEST PAIN: ICD-10-CM

## 2023-10-10 DIAGNOSIS — I10 ESSENTIAL HYPERTENSION: ICD-10-CM

## 2023-10-10 DIAGNOSIS — I25.10 CORONARY ARTERY DISEASE INVOLVING NATIVE CORONARY ARTERY OF NATIVE HEART WITHOUT ANGINA PECTORIS: ICD-10-CM

## 2023-10-10 PROCEDURE — G8482 FLU IMMUNIZE ORDER/ADMIN: HCPCS | Performed by: INTERNAL MEDICINE

## 2023-10-10 PROCEDURE — G8427 DOCREV CUR MEDS BY ELIG CLIN: HCPCS | Performed by: INTERNAL MEDICINE

## 2023-10-10 PROCEDURE — G8420 CALC BMI NORM PARAMETERS: HCPCS | Performed by: INTERNAL MEDICINE

## 2023-10-10 PROCEDURE — 99214 OFFICE O/P EST MOD 30 MIN: CPT | Performed by: INTERNAL MEDICINE

## 2023-10-10 PROCEDURE — 3075F SYST BP GE 130 - 139MM HG: CPT | Performed by: INTERNAL MEDICINE

## 2023-10-10 PROCEDURE — 3017F COLORECTAL CA SCREEN DOC REV: CPT | Performed by: INTERNAL MEDICINE

## 2023-10-10 PROCEDURE — 1124F ACP DISCUSS-NO DSCNMKR DOCD: CPT | Performed by: INTERNAL MEDICINE

## 2023-10-10 PROCEDURE — 4004F PT TOBACCO SCREEN RCVD TLK: CPT | Performed by: INTERNAL MEDICINE

## 2023-10-10 PROCEDURE — 3078F DIAST BP <80 MM HG: CPT | Performed by: INTERNAL MEDICINE

## 2023-10-10 RX ORDER — AMLODIPINE BESYLATE 10 MG/1
10 TABLET ORAL DAILY
Qty: 30 TABLET | Refills: 5 | OUTPATIENT
Start: 2023-10-10 | End: 2024-04-07

## 2023-10-10 NOTE — PATIENT INSTRUCTIONS
We are committed to providing you the best care possible. If you receive a survey after visiting one of our offices, please take time to share your experience concerning your physician office visit. These surveys are confidential and no health information about you is shared. We are eager to improve for you and we are counting on your feedback to help make that happen. **It is YOUR responsibilty to bring medication bottles and/or updated medication list to [address]. This will allow us to better serve you and all your healthcare needs**    Please be informed that if you contact our office outside of normal business hours the physician on call cannot help with any scheduling or rescheduling issues, procedure instruction questions or any type of medication issue. We advise you for any urgent/emergency that you go to the nearest emergency room! PLEASE CALL OUR OFFICE DURING NORMAL BUSINESS HOURS    Monday - Friday   8 am to 5 pm    [place]: [address] [place]: [phone]    [place]:  [phone]    Thank you for allowing us to care for you today! We want to ensure we can follow your treatment plan and we strive to give you the best outcomes and experience possible. If you ever have a life threatening emergency and call 911 - for an ambulance (EMS)   Our providers can only care for you at:   [facility] or [facility]. Even if you have someone take you or you drive yourself we can only care for you in a [facility]. Our providers are not setup at the other healthcare locations!

## 2023-10-17 DIAGNOSIS — Z79.4 TYPE 2 DIABETES MELLITUS WITHOUT COMPLICATION, WITH LONG-TERM CURRENT USE OF INSULIN (HCC): ICD-10-CM

## 2023-10-17 DIAGNOSIS — E11.9 TYPE 2 DIABETES MELLITUS WITHOUT COMPLICATION, WITH LONG-TERM CURRENT USE OF INSULIN (HCC): ICD-10-CM

## 2023-10-24 RX ORDER — PRASUGREL 10 MG/1
10 TABLET, FILM COATED ORAL DAILY
Qty: 90 TABLET | Refills: 1 | Status: SHIPPED | OUTPATIENT
Start: 2023-10-24 | End: 2024-04-21

## 2023-11-02 ENCOUNTER — TELEPHONE (OUTPATIENT)
Dept: CARDIOLOGY CLINIC | Age: 63
End: 2023-11-02

## 2023-11-02 NOTE — TELEPHONE ENCOUNTER
Insurance finally approved Chloestrol shot. Patient was not sure of the name. He would like to study up on this shot before taking it.

## 2023-11-09 ENCOUNTER — OFFICE VISIT (OUTPATIENT)
Dept: FAMILY MEDICINE CLINIC | Age: 63
End: 2023-11-09
Payer: MEDICARE

## 2023-11-09 VITALS
HEART RATE: 70 BPM | HEIGHT: 73 IN | DIASTOLIC BLOOD PRESSURE: 72 MMHG | OXYGEN SATURATION: 97 % | WEIGHT: 186.2 LBS | SYSTOLIC BLOOD PRESSURE: 132 MMHG | BODY MASS INDEX: 24.68 KG/M2

## 2023-11-09 DIAGNOSIS — E11.65 UNCONTROLLED TYPE 2 DIABETES MELLITUS WITH HYPERGLYCEMIA (HCC): ICD-10-CM

## 2023-11-09 PROCEDURE — 4004F PT TOBACCO SCREEN RCVD TLK: CPT | Performed by: PHYSICIAN ASSISTANT

## 2023-11-09 PROCEDURE — G8427 DOCREV CUR MEDS BY ELIG CLIN: HCPCS | Performed by: PHYSICIAN ASSISTANT

## 2023-11-09 PROCEDURE — G8482 FLU IMMUNIZE ORDER/ADMIN: HCPCS | Performed by: PHYSICIAN ASSISTANT

## 2023-11-09 PROCEDURE — 3078F DIAST BP <80 MM HG: CPT | Performed by: PHYSICIAN ASSISTANT

## 2023-11-09 PROCEDURE — 3017F COLORECTAL CA SCREEN DOC REV: CPT | Performed by: PHYSICIAN ASSISTANT

## 2023-11-09 PROCEDURE — 1124F ACP DISCUSS-NO DSCNMKR DOCD: CPT | Performed by: PHYSICIAN ASSISTANT

## 2023-11-09 PROCEDURE — 3075F SYST BP GE 130 - 139MM HG: CPT | Performed by: PHYSICIAN ASSISTANT

## 2023-11-09 PROCEDURE — G8420 CALC BMI NORM PARAMETERS: HCPCS | Performed by: PHYSICIAN ASSISTANT

## 2023-11-09 PROCEDURE — 99214 OFFICE O/P EST MOD 30 MIN: CPT | Performed by: PHYSICIAN ASSISTANT

## 2023-11-09 PROCEDURE — 2022F DILAT RTA XM EVC RTNOPTHY: CPT | Performed by: PHYSICIAN ASSISTANT

## 2023-11-09 PROCEDURE — 3052F HG A1C>EQUAL 8.0%<EQUAL 9.0%: CPT | Performed by: PHYSICIAN ASSISTANT

## 2023-11-09 RX ORDER — INSULIN GLARGINE 300 U/ML
30 INJECTION, SOLUTION SUBCUTANEOUS NIGHTLY
Qty: 6 ML | Refills: 2
Start: 2023-11-09 | End: 2024-02-07

## 2023-11-09 NOTE — PATIENT INSTRUCTIONS
DIABETES  Continue Metformin  Do 30 units of insulin at night. Continue tracking fasting blood glucose in the morning. GOAL A1C is 6.9% or less    BLOOD WORK  Please make sure to come to the lab at least 3 DAYS before your next appointment. Does NOT have to be fasting. [facility]   [address] [facility] [address] [address] [place], [place] [address]  Monday through Friday 8:00am - 12:30pm and 1:00pm - 4:00pm     [facility] AND LAB [facility]ENTER  [address], [address] [facility], [address] [facility]  Monday through Friday 7:00am - 5:00pm   Saturday 8:00am - 12:00pm       Diabetes Monitoring Instructions    Check your Fasting Blood Glucose (FBG) every morning before breakfast.   Write these numbers down -- Keep a daily log of your fasting blood glucose (FBG). Your fasting blood glucose (FBG) GOAL is to average between 70 and 120. Call and let us know if your fasting blood glucose starts to consistently average ABOVE 140 or BELOW 70. Call us right away or seek emergency medical attention if your blood glucose is ever ABOVE 300 or BELOW 60. LOW Blood Sugar  Know what to look for -- Symptoms of low blood sugar include: Sweating, weakness, fast heart rate, palpitations, tremor, feeling anxious or nervous, irritability, confusion, lightheadedness. If you develop these symptoms or your blood sugar is less than 80, you can drink 6 ounces of fruit juice, eat a teaspoon of honey or table sugar, or take 3-4 glucose tablets right away. HIGH Blood Sugar  Know what to look for -- Symptoms of high blood sugar include: Frequent urination, excessive thirst, nausea, vomiting, abdominal pain, rapid breathing, fatigue, lethargy, confusion. If you develop these symptoms, seek immediate medical attention.

## 2023-11-13 DIAGNOSIS — I10 ESSENTIAL HYPERTENSION: ICD-10-CM

## 2023-11-14 DIAGNOSIS — I10 ESSENTIAL HYPERTENSION: ICD-10-CM

## 2023-11-14 RX ORDER — AMLODIPINE BESYLATE 10 MG/1
10 TABLET ORAL DAILY
Qty: 30 TABLET | Refills: 5 | Status: SHIPPED | OUTPATIENT
Start: 2023-11-14 | End: 2024-05-12

## 2023-11-15 RX ORDER — AMLODIPINE BESYLATE 10 MG/1
10 TABLET ORAL DAILY
Qty: 30 TABLET | Refills: 11 | OUTPATIENT
Start: 2023-11-15 | End: 2024-05-13

## 2023-11-22 DIAGNOSIS — E11.65 UNCONTROLLED TYPE 2 DIABETES MELLITUS WITH HYPERGLYCEMIA (HCC): ICD-10-CM

## 2023-11-22 RX ORDER — INSULIN GLARGINE 300 U/ML
30 INJECTION, SOLUTION SUBCUTANEOUS NIGHTLY
Qty: 6 ML | Refills: 2 | OUTPATIENT
Start: 2023-11-22 | End: 2024-02-20

## 2023-12-08 DIAGNOSIS — E11.9 TYPE 2 DIABETES MELLITUS WITHOUT COMPLICATION, WITH LONG-TERM CURRENT USE OF INSULIN (HCC): ICD-10-CM

## 2023-12-08 DIAGNOSIS — Z79.4 TYPE 2 DIABETES MELLITUS WITHOUT COMPLICATION, WITH LONG-TERM CURRENT USE OF INSULIN (HCC): ICD-10-CM

## 2024-01-08 DIAGNOSIS — Z79.4 TYPE 2 DIABETES MELLITUS WITH HYPERGLYCEMIA, WITH LONG-TERM CURRENT USE OF INSULIN (HCC): ICD-10-CM

## 2024-01-08 DIAGNOSIS — E11.65 UNCONTROLLED TYPE 2 DIABETES MELLITUS WITH HYPERGLYCEMIA (HCC): ICD-10-CM

## 2024-01-08 DIAGNOSIS — E11.65 TYPE 2 DIABETES MELLITUS WITH HYPERGLYCEMIA, WITH LONG-TERM CURRENT USE OF INSULIN (HCC): ICD-10-CM

## 2024-01-09 LAB
EST. AVERAGE GLUCOSE BLD GHB EST-MCNC: 171.4 MG/DL
HBA1C MFR BLD: 7.6 %

## 2024-01-11 ENCOUNTER — OFFICE VISIT (OUTPATIENT)
Dept: FAMILY MEDICINE CLINIC | Age: 64
End: 2024-01-11
Payer: MEDICARE

## 2024-01-11 VITALS
WEIGHT: 191.4 LBS | HEIGHT: 73 IN | RESPIRATION RATE: 12 BRPM | OXYGEN SATURATION: 97 % | SYSTOLIC BLOOD PRESSURE: 128 MMHG | BODY MASS INDEX: 25.37 KG/M2 | DIASTOLIC BLOOD PRESSURE: 68 MMHG | HEART RATE: 68 BPM

## 2024-01-11 DIAGNOSIS — E78.2 MIXED HYPERLIPIDEMIA: ICD-10-CM

## 2024-01-11 DIAGNOSIS — R53.83 FATIGUE, UNSPECIFIED TYPE: ICD-10-CM

## 2024-01-11 DIAGNOSIS — E11.65 UNCONTROLLED TYPE 2 DIABETES MELLITUS WITH HYPERGLYCEMIA (HCC): Primary | ICD-10-CM

## 2024-01-11 PROCEDURE — 3078F DIAST BP <80 MM HG: CPT | Performed by: PHYSICIAN ASSISTANT

## 2024-01-11 PROCEDURE — 3051F HG A1C>EQUAL 7.0%<8.0%: CPT | Performed by: PHYSICIAN ASSISTANT

## 2024-01-11 PROCEDURE — 3074F SYST BP LT 130 MM HG: CPT | Performed by: PHYSICIAN ASSISTANT

## 2024-01-11 PROCEDURE — 2022F DILAT RTA XM EVC RTNOPTHY: CPT | Performed by: PHYSICIAN ASSISTANT

## 2024-01-11 PROCEDURE — 1124F ACP DISCUSS-NO DSCNMKR DOCD: CPT | Performed by: PHYSICIAN ASSISTANT

## 2024-01-11 PROCEDURE — G8427 DOCREV CUR MEDS BY ELIG CLIN: HCPCS | Performed by: PHYSICIAN ASSISTANT

## 2024-01-11 PROCEDURE — G8482 FLU IMMUNIZE ORDER/ADMIN: HCPCS | Performed by: PHYSICIAN ASSISTANT

## 2024-01-11 PROCEDURE — 3017F COLORECTAL CA SCREEN DOC REV: CPT | Performed by: PHYSICIAN ASSISTANT

## 2024-01-11 PROCEDURE — G8417 CALC BMI ABV UP PARAM F/U: HCPCS | Performed by: PHYSICIAN ASSISTANT

## 2024-01-11 PROCEDURE — 4004F PT TOBACCO SCREEN RCVD TLK: CPT | Performed by: PHYSICIAN ASSISTANT

## 2024-01-11 PROCEDURE — 99214 OFFICE O/P EST MOD 30 MIN: CPT | Performed by: PHYSICIAN ASSISTANT

## 2024-01-11 SDOH — ECONOMIC STABILITY: INCOME INSECURITY: HOW HARD IS IT FOR YOU TO PAY FOR THE VERY BASICS LIKE FOOD, HOUSING, MEDICAL CARE, AND HEATING?: NOT HARD AT ALL

## 2024-01-11 SDOH — ECONOMIC STABILITY: FOOD INSECURITY: WITHIN THE PAST 12 MONTHS, YOU WORRIED THAT YOUR FOOD WOULD RUN OUT BEFORE YOU GOT MONEY TO BUY MORE.: NEVER TRUE

## 2024-01-11 SDOH — ECONOMIC STABILITY: HOUSING INSECURITY
IN THE LAST 12 MONTHS, WAS THERE A TIME WHEN YOU DID NOT HAVE A STEADY PLACE TO SLEEP OR SLEPT IN A SHELTER (INCLUDING NOW)?: NO

## 2024-01-11 SDOH — ECONOMIC STABILITY: FOOD INSECURITY: WITHIN THE PAST 12 MONTHS, THE FOOD YOU BOUGHT JUST DIDN'T LAST AND YOU DIDN'T HAVE MONEY TO GET MORE.: NEVER TRUE

## 2024-01-11 ASSESSMENT — PATIENT HEALTH QUESTIONNAIRE - PHQ9
2. FEELING DOWN, DEPRESSED OR HOPELESS: 0
1. LITTLE INTEREST OR PLEASURE IN DOING THINGS: 0
SUM OF ALL RESPONSES TO PHQ9 QUESTIONS 1 & 2: 0
SUM OF ALL RESPONSES TO PHQ QUESTIONS 1-9: 0

## 2024-01-11 NOTE — PROGRESS NOTES
Fatigue, unspecified type  Patient would like to have his testosterone levels checked. Will check now and again in 3 months.   - Testosterone, free, total; Future  - Testosterone, free, total; Future    3. Mixed hyperlipidemia  Will check fasting blood work next time and advise based on findings.   - Comprehensive Metabolic Panel; Future  - CBC with Auto Differential; Future  - Lipid Panel; Future      I spent 30 minutes on this encounter, reviewing patient records, gathering history, examining the patient, and documenting.    Return in about 3 months (around 4/16/2024).            Electronically signed by Barrera Childs PA-C on 1/11/2024

## 2024-01-11 NOTE — PATIENT INSTRUCTIONS
Last A1C was on 1/8/2024 at 7.6%    Next A1C will be due on 4/8/24 or after       BLOOD WORK  Please come to the lab for FASTING blood work about 3 DAYS BEFORE your next appointment.    Be sure to FAST for at least 12 HOURS before having your blood work drawn. Drink plenty of water before coming in - this will make it easier to draw your blood. You may also have black coffee or black tea if you wish (BUT NO CREAM OR SUGAR) . Lab hours are as follows:     [facility]   [address], [facility] [address], [place] [address]  Monday through Friday 8:00am - 12:30pm and 1:00pm - 4:00pm     [facility]  [address] [place] [address]  Monday through Friday 7:00am - 5:00pm   Saturday 8:00am - 12:00pm     You can check your results on [facility] if you wish. We will discuss the results at your appointment.

## 2024-01-14 LAB
SHBG SERPL-SCNC: 36 NMOL/L (ref 11–80)
TESTOST FREE SERPL-MCNC: 30.7 PG/ML (ref 47–244)
TESTOST SERPL-MCNC: 172 NG/DL (ref 220–1000)

## 2024-01-15 NOTE — RESULT ENCOUNTER NOTE
Pete Flowers,    Thanks for getting your blood work done. Your testosterone levels did come back a little bit low. We will check them again in 3 months (in April) to see if they are still low.     Unfortunately testosterone replacement therapy does increase your risk of heart attack and stroke, so if you were interested in that, I would send you to endocrinology and make sure Dr. Griffith signs off on it, as well.     Let me know if you have any questions.  Barrera Childs PA-C

## 2024-03-14 RX ORDER — PRASUGREL 10 MG/1
10 TABLET, FILM COATED ORAL DAILY
Qty: 90 TABLET | Refills: 1 | Status: SHIPPED | OUTPATIENT
Start: 2024-03-14 | End: 2024-09-10

## 2024-03-28 DIAGNOSIS — E11.65 UNCONTROLLED TYPE 2 DIABETES MELLITUS WITH HYPERGLYCEMIA (HCC): ICD-10-CM

## 2024-03-28 DIAGNOSIS — E78.2 MIXED HYPERLIPIDEMIA: ICD-10-CM

## 2024-03-28 DIAGNOSIS — R53.83 FATIGUE, UNSPECIFIED TYPE: ICD-10-CM

## 2024-03-28 LAB
ALBUMIN SERPL-MCNC: 4.5 G/DL (ref 3.4–5)
ALBUMIN/GLOB SERPL: 2.1 {RATIO} (ref 1.1–2.2)
ALP SERPL-CCNC: 97 U/L (ref 40–129)
ALT SERPL-CCNC: 14 U/L (ref 10–40)
ANION GAP SERPL CALCULATED.3IONS-SCNC: 18 MMOL/L (ref 3–16)
AST SERPL-CCNC: 14 U/L (ref 15–37)
BASOPHILS # BLD: 0 K/UL (ref 0–0.2)
BASOPHILS NFR BLD: 0.5 %
BILIRUB SERPL-MCNC: 0.3 MG/DL (ref 0–1)
BUN SERPL-MCNC: 19 MG/DL (ref 7–20)
CALCIUM SERPL-MCNC: 9.2 MG/DL (ref 8.3–10.6)
CHLORIDE SERPL-SCNC: 100 MMOL/L (ref 99–110)
CHOLEST SERPL-MCNC: 198 MG/DL (ref 0–199)
CO2 SERPL-SCNC: 20 MMOL/L (ref 21–32)
CREAT SERPL-MCNC: 1.1 MG/DL (ref 0.8–1.3)
DEPRECATED RDW RBC AUTO: 14.5 % (ref 12.4–15.4)
EOSINOPHIL # BLD: 0.4 K/UL (ref 0–0.6)
EOSINOPHIL NFR BLD: 5 %
GFR SERPLBLD CREATININE-BSD FMLA CKD-EPI: 74 ML/MIN/{1.73_M2}
GLUCOSE SERPL-MCNC: 198 MG/DL (ref 70–99)
HCT VFR BLD AUTO: 44 % (ref 40.5–52.5)
HDLC SERPL-MCNC: 37 MG/DL (ref 40–60)
HGB BLD-MCNC: 14.8 G/DL (ref 13.5–17.5)
LDLC SERPL CALC-MCNC: 132 MG/DL
LYMPHOCYTES # BLD: 2.2 K/UL (ref 1–5.1)
LYMPHOCYTES NFR BLD: 26.6 %
MCH RBC QN AUTO: 30 PG (ref 26–34)
MCHC RBC AUTO-ENTMCNC: 33.8 G/DL (ref 31–36)
MCV RBC AUTO: 88.8 FL (ref 80–100)
MONOCYTES # BLD: 0.7 K/UL (ref 0–1.3)
MONOCYTES NFR BLD: 8.7 %
NEUTROPHILS # BLD: 4.9 K/UL (ref 1.7–7.7)
NEUTROPHILS NFR BLD: 59.2 %
PLATELET # BLD AUTO: 261 K/UL (ref 135–450)
PMV BLD AUTO: 10.4 FL (ref 5–10.5)
POTASSIUM SERPL-SCNC: 4.6 MMOL/L (ref 3.5–5.1)
PROT SERPL-MCNC: 6.6 G/DL (ref 6.4–8.2)
RBC # BLD AUTO: 4.95 M/UL (ref 4.2–5.9)
SODIUM SERPL-SCNC: 138 MMOL/L (ref 136–145)
TRIGL SERPL-MCNC: 143 MG/DL (ref 0–150)
VLDLC SERPL CALC-MCNC: 29 MG/DL
WBC # BLD AUTO: 8.2 K/UL (ref 4–11)

## 2024-03-29 LAB
EST. AVERAGE GLUCOSE BLD GHB EST-MCNC: 157.1 MG/DL
HBA1C MFR BLD: 7.1 %
SHBG SERPL-SCNC: 23 NMOL/L (ref 19–76)
TESTOST FREE SERPL-MCNC: 37.5 PG/ML (ref 47–244)
TESTOST SERPL-MCNC: 164 NG/DL (ref 193–740)

## 2024-04-04 DIAGNOSIS — I10 ESSENTIAL HYPERTENSION: ICD-10-CM

## 2024-04-04 RX ORDER — LISINOPRIL 20 MG/1
20 TABLET ORAL DAILY
Qty: 90 TABLET | Refills: 1 | Status: SHIPPED | OUTPATIENT
Start: 2024-04-04 | End: 2024-10-01

## 2024-04-11 ENCOUNTER — OFFICE VISIT (OUTPATIENT)
Dept: FAMILY MEDICINE CLINIC | Age: 64
End: 2024-04-11
Payer: MEDICARE

## 2024-04-11 VITALS
DIASTOLIC BLOOD PRESSURE: 82 MMHG | BODY MASS INDEX: 24.65 KG/M2 | OXYGEN SATURATION: 97 % | WEIGHT: 186 LBS | HEART RATE: 55 BPM | HEIGHT: 73 IN | SYSTOLIC BLOOD PRESSURE: 138 MMHG

## 2024-04-11 DIAGNOSIS — Z79.4 TYPE 2 DIABETES MELLITUS WITHOUT COMPLICATION, WITH LONG-TERM CURRENT USE OF INSULIN (HCC): Primary | ICD-10-CM

## 2024-04-11 DIAGNOSIS — E11.9 TYPE 2 DIABETES MELLITUS WITHOUT COMPLICATION, WITH LONG-TERM CURRENT USE OF INSULIN (HCC): Primary | ICD-10-CM

## 2024-04-11 DIAGNOSIS — R53.83 FATIGUE, UNSPECIFIED TYPE: ICD-10-CM

## 2024-04-11 DIAGNOSIS — E11.65 UNCONTROLLED TYPE 2 DIABETES MELLITUS WITH HYPERGLYCEMIA (HCC): ICD-10-CM

## 2024-04-11 DIAGNOSIS — E29.1 HYPOGONADISM IN MALE: ICD-10-CM

## 2024-04-11 LAB
T4 FREE SERPL-MCNC: 1.5 NG/DL (ref 0.9–1.8)
TSH SERPL DL<=0.005 MIU/L-ACNC: 0.66 UIU/ML (ref 0.27–4.2)
VIT B12 SERPL-MCNC: >2000 PG/ML (ref 211–911)

## 2024-04-11 PROCEDURE — 99213 OFFICE O/P EST LOW 20 MIN: CPT | Performed by: PHYSICIAN ASSISTANT

## 2024-04-11 PROCEDURE — 3079F DIAST BP 80-89 MM HG: CPT | Performed by: PHYSICIAN ASSISTANT

## 2024-04-11 PROCEDURE — 3017F COLORECTAL CA SCREEN DOC REV: CPT | Performed by: PHYSICIAN ASSISTANT

## 2024-04-11 PROCEDURE — 1124F ACP DISCUSS-NO DSCNMKR DOCD: CPT | Performed by: PHYSICIAN ASSISTANT

## 2024-04-11 PROCEDURE — G8420 CALC BMI NORM PARAMETERS: HCPCS | Performed by: PHYSICIAN ASSISTANT

## 2024-04-11 PROCEDURE — 2022F DILAT RTA XM EVC RTNOPTHY: CPT | Performed by: PHYSICIAN ASSISTANT

## 2024-04-11 PROCEDURE — 3075F SYST BP GE 130 - 139MM HG: CPT | Performed by: PHYSICIAN ASSISTANT

## 2024-04-11 PROCEDURE — G8427 DOCREV CUR MEDS BY ELIG CLIN: HCPCS | Performed by: PHYSICIAN ASSISTANT

## 2024-04-11 PROCEDURE — 3051F HG A1C>EQUAL 7.0%<8.0%: CPT | Performed by: PHYSICIAN ASSISTANT

## 2024-04-11 PROCEDURE — 4004F PT TOBACCO SCREEN RCVD TLK: CPT | Performed by: PHYSICIAN ASSISTANT

## 2024-04-11 NOTE — PATIENT INSTRUCTIONS
Next A1C will be due: June 28, 2024    Health Maintenance    VACCINES - I recommend you get the following vaccines at the Health Department or a local pharmacy ([place], [facility], [facility], [facility], [facility], etc).   RSV vaccine   Shingles vaccine    Next tetanus booster (Tdap) will be due: 2028 (Tetanus boosters are every 10 years).     CANCER SCREENINGS  Next COLON CANCER SCREENING will be due: DUE since April 2023

## 2024-04-11 NOTE — PROGRESS NOTES
Unit Dial, (TOUJEO MAX SOLOSTAR) 300 UNIT/ML SOPN Inject 30 Units into the skin nightly 6 mL 2    aspirin 81 MG EC tablet Take 1 tablet by mouth daily. 30 tablet 3    ULTICARE PEN NEEDLES 29G X 12.7MM MISC USE AS DIRECTED 100 each 5    ULTICARE MICRO PEN NEEDLES 32G X 4 MM MISC USE AS DIRECTED 100 each 10    blood glucose test strips (ASCENSIA AUTODISC VI;ONE TOUCH ULTRA TEST VI) strip 1 each by In Vitro route daily As needed.      Lancets Misc. (ACCU-CHEK MULTICLIX LANCET DEV) KIT by Does not apply route       No current facility-administered medications for this visit.       ALLERGIES  Allergies   Allergen Reactions    Statins Myalgia    Trulicity [Dulaglutide] Nausea And Vomiting       RECENT LABS    Lab Results   Component Value Date    LABA1C 7.1 03/28/2024     Lab Results   Component Value Date    .1 03/28/2024       Lab Results   Component Value Date    CHOL 198 03/28/2024    CHOL 231 (H) 09/26/2023    CHOL 253 (H) 06/05/2023     Lab Results   Component Value Date    LDLCHOLESTEROL 69 04/22/2016    LDLCALC 132 (H) 03/28/2024    LDLCALC 155 (H) 09/26/2023    LDLCALC 183 (H) 06/05/2023       Lab Results   Component Value Date    WBC 8.2 03/28/2024    HGB 14.8 03/28/2024    HCT 44.0 03/28/2024    MCV 88.8 03/28/2024     03/28/2024       PHYSICAL EXAM  /82 (Site: Left Upper Arm, Position: Sitting, Cuff Size: Large Adult)   Pulse 55   Ht 1.854 m (6' 1\")   Wt 84.4 kg (186 lb)   SpO2 97%   BMI 24.54 kg/m²     Physical Exam  Constitutional:       Appearance: Normal appearance.   HENT:      Head: Normocephalic and atraumatic.   Eyes:      Comments: EOM grossly intact.   Cardiovascular:      Rate and Rhythm: Normal rate and regular rhythm.      Heart sounds: No murmur heard.     No friction rub. No gallop.   Pulmonary:      Effort: Pulmonary effort is normal.      Breath sounds: Normal breath sounds. No wheezing, rhonchi or rales.   Skin:     General: Skin is warm and dry.   Neurological:

## 2024-04-12 RX ORDER — INSULIN GLARGINE 300 U/ML
20 INJECTION, SOLUTION SUBCUTANEOUS NIGHTLY
Qty: 6 ML | Refills: 2 | Status: SHIPPED | OUTPATIENT
Start: 2024-04-12

## 2024-04-15 ENCOUNTER — OFFICE VISIT (OUTPATIENT)
Dept: CARDIOLOGY CLINIC | Age: 64
End: 2024-04-15
Payer: MEDICARE

## 2024-04-15 ENCOUNTER — TELEPHONE (OUTPATIENT)
Dept: FAMILY MEDICINE CLINIC | Age: 64
End: 2024-04-15

## 2024-04-15 ENCOUNTER — TELEPHONE (OUTPATIENT)
Dept: CARDIOLOGY CLINIC | Age: 64
End: 2024-04-15

## 2024-04-15 VITALS
BODY MASS INDEX: 25.13 KG/M2 | HEART RATE: 76 BPM | OXYGEN SATURATION: 94 % | DIASTOLIC BLOOD PRESSURE: 86 MMHG | HEIGHT: 73 IN | WEIGHT: 189.6 LBS | SYSTOLIC BLOOD PRESSURE: 138 MMHG

## 2024-04-15 DIAGNOSIS — R94.31 ABNORMAL EKG: ICD-10-CM

## 2024-04-15 DIAGNOSIS — I10 PRIMARY HYPERTENSION: ICD-10-CM

## 2024-04-15 DIAGNOSIS — I73.9 CLAUDICATION (HCC): ICD-10-CM

## 2024-04-15 DIAGNOSIS — R07.89 OTHER CHEST PAIN: ICD-10-CM

## 2024-04-15 DIAGNOSIS — I25.10 CORONARY ARTERY DISEASE INVOLVING NATIVE CORONARY ARTERY OF NATIVE HEART WITHOUT ANGINA PECTORIS: ICD-10-CM

## 2024-04-15 DIAGNOSIS — I50.22 CHRONIC SYSTOLIC (CONGESTIVE) HEART FAILURE (HCC): ICD-10-CM

## 2024-04-15 DIAGNOSIS — E78.5 DYSLIPIDEMIA: Primary | ICD-10-CM

## 2024-04-15 PROCEDURE — 4004F PT TOBACCO SCREEN RCVD TLK: CPT | Performed by: INTERNAL MEDICINE

## 2024-04-15 PROCEDURE — G8417 CALC BMI ABV UP PARAM F/U: HCPCS | Performed by: INTERNAL MEDICINE

## 2024-04-15 PROCEDURE — 1124F ACP DISCUSS-NO DSCNMKR DOCD: CPT | Performed by: INTERNAL MEDICINE

## 2024-04-15 PROCEDURE — 3075F SYST BP GE 130 - 139MM HG: CPT | Performed by: INTERNAL MEDICINE

## 2024-04-15 PROCEDURE — 3017F COLORECTAL CA SCREEN DOC REV: CPT | Performed by: INTERNAL MEDICINE

## 2024-04-15 PROCEDURE — G8427 DOCREV CUR MEDS BY ELIG CLIN: HCPCS | Performed by: INTERNAL MEDICINE

## 2024-04-15 PROCEDURE — 3079F DIAST BP 80-89 MM HG: CPT | Performed by: INTERNAL MEDICINE

## 2024-04-15 PROCEDURE — 99214 OFFICE O/P EST MOD 30 MIN: CPT | Performed by: INTERNAL MEDICINE

## 2024-04-15 NOTE — TELEPHONE ENCOUNTER
Thyroid levels were all normal and vitamin B12 was above average, so all of those levels look great. What did his cardiologist have to say about testosterone therapy?

## 2024-04-15 NOTE — PATIENT INSTRUCTIONS
We are committed to providing you the best care possible.    If you receive a survey after visiting one of our offices, please take time to share your experience concerning your physician office visit.  These surveys are confidential and no health information about you is shared.    We are eager to improve for you and we are counting on your feedback to help make that happen.      **It is YOUR responsibilty to bring medication bottles and/or updated medication list to EACH APPOINTMENT. This will allow us to better serve you and all your healthcare needs**    Thank you for allowing us to care for you today!   We want to ensure we can follow your treatment plan and we strive to give you the best outcomes and experience possible.   If you ever have a life threatening emergency and call 911 - for an ambulance (EMS)   Our providers can only care for you at:   [facility] or [facility].   Even if you have someone take you or you drive yourself we can only care for you in a [facility] facility. Our providers are not setup at the other healthcare locations!     Please be informed that if you contact our office outside of normal business hours the physician on call cannot help with any scheduling or rescheduling issues, procedure instruction questions or any type of medication issue.    We advise you for any urgent/emergency that you go to the nearest emergency room!    PLEASE CALL OUR OFFICE DURING NORMAL BUSINESS HOURS    Monday - Friday   8 am to 5 pm    [place]: [phone]    [place]: [phone]    [place]:  [phone]

## 2024-04-15 NOTE — PROGRESS NOTES
medications and tests reviewed, continue all other medications of all above medical condition listed as is.  All labs, medications and tests reviewed, continue all other medications of all above medical condition listed as is.

## 2024-04-15 NOTE — RESULT ENCOUNTER NOTE
Pete Flowers,    Thanks for getting your blood work done. Thyroid and vitamin B12 levels were both normal.     What did Dr. Griffith have to say about testosterone therapy?    Let me know if you have any questions.  Barrera Childs PA-C

## 2024-04-15 NOTE — TELEPHONE ENCOUNTER
To Barrera-    Patient wants to know if you were going to call in something for his thyroid after seeing the results.     Please advise

## 2024-04-16 NOTE — TELEPHONE ENCOUNTER
Patient notified.      States he spoke with cardiologist about testosterone.  State cardio said info he had states its not good for cardiac patients but that it was old information that he had.  States he is going to do some research on it and get back with the patient who will let us know

## 2024-05-09 ENCOUNTER — TELEPHONE (OUTPATIENT)
Dept: CARDIOLOGY CLINIC | Age: 64
End: 2024-05-09

## 2024-05-24 DIAGNOSIS — I10 ESSENTIAL HYPERTENSION: ICD-10-CM

## 2024-05-28 RX ORDER — AMLODIPINE BESYLATE 10 MG/1
10 TABLET ORAL DAILY
Qty: 30 TABLET | Refills: 4 | Status: SHIPPED | OUTPATIENT
Start: 2024-05-28 | End: 2024-11-24

## 2024-06-26 DIAGNOSIS — I10 ESSENTIAL HYPERTENSION: ICD-10-CM

## 2024-06-26 NOTE — TELEPHONE ENCOUNTER
Medication:   Requested Prescriptions     Pending Prescriptions Disp Refills    amLODIPine (NORVASC) 10 MG tablet 30 tablet 4     Sig: Take 1 tablet by mouth daily       Pharmacy: [facility]     Patient Phone Number: [phone] (home)     Last appt: 4/11/2024   Next appt: Visit date not found    Last Labs DM:   Lab Results   Component Value Date/Time    LABA1C 7.1 03/28/2024 08:28 AM     Last Lipid:   Lab Results   Component Value Date/Time    CHOL 198 03/28/2024 08:28 AM    TRIG 143 03/28/2024 08:28 AM    HDL 37 03/28/2024 08:28 AM     Last PSA: No results found for: \"PSA\"  Last Thyroid:   Lab Results   Component Value Date/Time    TSH 0.66 04/11/2024 09:14 AM    T4FREE 1.5 04/11/2024 09:14 AM

## 2024-06-27 RX ORDER — AMLODIPINE BESYLATE 10 MG/1
10 TABLET ORAL DAILY
Qty: 90 TABLET | Refills: 1 | Status: SHIPPED | OUTPATIENT
Start: 2024-06-27 | End: 2024-12-24

## 2024-07-15 DIAGNOSIS — I10 ESSENTIAL HYPERTENSION: ICD-10-CM

## 2024-07-16 RX ORDER — LISINOPRIL 20 MG/1
20 TABLET ORAL DAILY
Qty: 90 TABLET | Refills: 1 | Status: SHIPPED | OUTPATIENT
Start: 2024-07-16 | End: 2025-01-12

## 2024-07-18 RX ORDER — PRASUGREL 10 MG/1
10 TABLET, FILM COATED ORAL DAILY
Qty: 90 TABLET | Refills: 1 | Status: SHIPPED | OUTPATIENT
Start: 2024-07-18 | End: 2025-01-14

## 2024-07-30 DIAGNOSIS — Z79.4 TYPE 2 DIABETES MELLITUS WITHOUT COMPLICATION, WITH LONG-TERM CURRENT USE OF INSULIN (HCC): ICD-10-CM

## 2024-07-30 DIAGNOSIS — E11.9 TYPE 2 DIABETES MELLITUS WITHOUT COMPLICATION, WITH LONG-TERM CURRENT USE OF INSULIN (HCC): ICD-10-CM

## 2024-07-30 LAB
ANION GAP SERPL CALCULATED.3IONS-SCNC: 14 MMOL/L (ref 3–16)
BUN SERPL-MCNC: 27 MG/DL (ref 7–20)
CALCIUM SERPL-MCNC: 9.6 MG/DL (ref 8.3–10.6)
CHLORIDE SERPL-SCNC: 101 MMOL/L (ref 99–110)
CO2 SERPL-SCNC: 24 MMOL/L (ref 21–32)
CREAT SERPL-MCNC: 1.2 MG/DL (ref 0.8–1.3)
EST. AVERAGE GLUCOSE BLD GHB EST-MCNC: 211.6 MG/DL
GFR SERPLBLD CREATININE-BSD FMLA CKD-EPI: 67 ML/MIN/{1.73_M2}
GLUCOSE SERPL-MCNC: 176 MG/DL (ref 70–99)
HBA1C MFR BLD: 9 %
POTASSIUM SERPL-SCNC: 4.9 MMOL/L (ref 3.5–5.1)
SODIUM SERPL-SCNC: 139 MMOL/L (ref 136–145)

## 2024-08-01 ENCOUNTER — OFFICE VISIT (OUTPATIENT)
Dept: FAMILY MEDICINE CLINIC | Age: 64
End: 2024-08-01
Payer: MEDICARE

## 2024-08-01 VITALS
BODY MASS INDEX: 24.52 KG/M2 | HEART RATE: 60 BPM | OXYGEN SATURATION: 97 % | WEIGHT: 185 LBS | DIASTOLIC BLOOD PRESSURE: 76 MMHG | HEIGHT: 73 IN | SYSTOLIC BLOOD PRESSURE: 156 MMHG | TEMPERATURE: 96.8 F

## 2024-08-01 VITALS
OXYGEN SATURATION: 97 % | DIASTOLIC BLOOD PRESSURE: 74 MMHG | WEIGHT: 185 LBS | HEIGHT: 73 IN | HEART RATE: 60 BPM | BODY MASS INDEX: 24.52 KG/M2 | SYSTOLIC BLOOD PRESSURE: 122 MMHG

## 2024-08-01 DIAGNOSIS — I10 PRIMARY HYPERTENSION: ICD-10-CM

## 2024-08-01 DIAGNOSIS — E78.5 HYPERLIPIDEMIA, UNSPECIFIED HYPERLIPIDEMIA TYPE: ICD-10-CM

## 2024-08-01 DIAGNOSIS — K08.89 TOOTHACHE: ICD-10-CM

## 2024-08-01 DIAGNOSIS — Z00.00 INITIAL MEDICARE ANNUAL WELLNESS VISIT: Primary | ICD-10-CM

## 2024-08-01 DIAGNOSIS — E11.65 UNCONTROLLED TYPE 2 DIABETES MELLITUS WITH HYPERGLYCEMIA (HCC): Primary | ICD-10-CM

## 2024-08-01 PROCEDURE — 4004F PT TOBACCO SCREEN RCVD TLK: CPT | Performed by: PHYSICIAN ASSISTANT

## 2024-08-01 PROCEDURE — G8420 CALC BMI NORM PARAMETERS: HCPCS | Performed by: PHYSICIAN ASSISTANT

## 2024-08-01 PROCEDURE — 3074F SYST BP LT 130 MM HG: CPT | Performed by: INTERNAL MEDICINE

## 2024-08-01 PROCEDURE — 3052F HG A1C>EQUAL 8.0%<EQUAL 9.0%: CPT | Performed by: PHYSICIAN ASSISTANT

## 2024-08-01 PROCEDURE — 3078F DIAST BP <80 MM HG: CPT | Performed by: INTERNAL MEDICINE

## 2024-08-01 PROCEDURE — 3017F COLORECTAL CA SCREEN DOC REV: CPT | Performed by: INTERNAL MEDICINE

## 2024-08-01 PROCEDURE — 1124F ACP DISCUSS-NO DSCNMKR DOCD: CPT | Performed by: PHYSICIAN ASSISTANT

## 2024-08-01 PROCEDURE — 3078F DIAST BP <80 MM HG: CPT | Performed by: PHYSICIAN ASSISTANT

## 2024-08-01 PROCEDURE — G8427 DOCREV CUR MEDS BY ELIG CLIN: HCPCS | Performed by: PHYSICIAN ASSISTANT

## 2024-08-01 PROCEDURE — 2022F DILAT RTA XM EVC RTNOPTHY: CPT | Performed by: PHYSICIAN ASSISTANT

## 2024-08-01 PROCEDURE — G0438 PPPS, INITIAL VISIT: HCPCS | Performed by: INTERNAL MEDICINE

## 2024-08-01 PROCEDURE — 3074F SYST BP LT 130 MM HG: CPT | Performed by: PHYSICIAN ASSISTANT

## 2024-08-01 PROCEDURE — 3017F COLORECTAL CA SCREEN DOC REV: CPT | Performed by: PHYSICIAN ASSISTANT

## 2024-08-01 PROCEDURE — 99214 OFFICE O/P EST MOD 30 MIN: CPT | Performed by: PHYSICIAN ASSISTANT

## 2024-08-01 PROCEDURE — 1124F ACP DISCUSS-NO DSCNMKR DOCD: CPT | Performed by: INTERNAL MEDICINE

## 2024-08-01 RX ORDER — AMOXICILLIN 875 MG/1
875 TABLET, COATED ORAL 2 TIMES DAILY
Qty: 14 TABLET | Refills: 0 | Status: SHIPPED | OUTPATIENT
Start: 2024-08-01 | End: 2024-08-08

## 2024-08-01 ASSESSMENT — PATIENT HEALTH QUESTIONNAIRE - PHQ9
SUM OF ALL RESPONSES TO PHQ QUESTIONS 1-9: 15
2. FEELING DOWN, DEPRESSED OR HOPELESS: NEARLY EVERY DAY
SUM OF ALL RESPONSES TO PHQ QUESTIONS 1-9: 15
8. MOVING OR SPEAKING SO SLOWLY THAT OTHER PEOPLE COULD HAVE NOTICED. OR THE OPPOSITE, BEING SO FIGETY OR RESTLESS THAT YOU HAVE BEEN MOVING AROUND A LOT MORE THAN USUAL: NOT AT ALL
SUM OF ALL RESPONSES TO PHQ9 QUESTIONS 1 & 2: 6
5. POOR APPETITE OR OVEREATING: NOT AT ALL
6. FEELING BAD ABOUT YOURSELF - OR THAT YOU ARE A FAILURE OR HAVE LET YOURSELF OR YOUR FAMILY DOWN: NEARLY EVERY DAY
4. FEELING TIRED OR HAVING LITTLE ENERGY: NEARLY EVERY DAY
10. IF YOU CHECKED OFF ANY PROBLEMS, HOW DIFFICULT HAVE THESE PROBLEMS MADE IT FOR YOU TO DO YOUR WORK, TAKE CARE OF THINGS AT HOME, OR GET ALONG WITH OTHER PEOPLE: NOT DIFFICULT AT ALL
9. THOUGHTS THAT YOU WOULD BE BETTER OFF DEAD, OR OF HURTING YOURSELF: NOT AT ALL
7. TROUBLE CONCENTRATING ON THINGS, SUCH AS READING THE NEWSPAPER OR WATCHING TELEVISION: NOT AT ALL
3. TROUBLE FALLING OR STAYING ASLEEP: NEARLY EVERY DAY
1. LITTLE INTEREST OR PLEASURE IN DOING THINGS: NEARLY EVERY DAY
SUM OF ALL RESPONSES TO PHQ QUESTIONS 1-9: 15
SUM OF ALL RESPONSES TO PHQ QUESTIONS 1-9: 15

## 2024-08-01 NOTE — PATIENT INSTRUCTIONS
back, neck, jaw, or upper belly or in one or both shoulders or arms.     Lightheadedness or sudden weakness.     A fast or irregular heartbeat.   After you call 911, the  may tell you to chew 1 adult-strength or 2 to 4 low-dose aspirin. Wait for an ambulance. Do not try to drive yourself.  Watch closely for changes in your health, and be sure to contact your doctor if you have any problems.  Where can you learn more?  Go to https://www.EUR[employer].net/patientEd and enter F075 to learn more about \"A Healthy Heart: Care Instructions.\"  Current as of: June 24, 2023  Content Version: 14.1  © 8147-2197 [employer].   Care instructions adapted under license by [employer]. If you have questions about a medical condition or this instruction, always ask your healthcare professional. [employer] disclaims any warranty or liability for your use of this information.      Personalized Preventive Plan for Lionel Ruth - 8/1/2024  Medicare offers a range of preventive health benefits. Some of the tests and screenings are paid in full while other may be subject to a deductible, co-insurance, and/or copay.    Some of these benefits include a comprehensive review of your medical history including lifestyle, illnesses that may run in your family, and various assessments and screenings as appropriate.    After reviewing your medical record and screening and assessments performed today your provider may have ordered immunizations, labs, imaging, and/or referrals for you.  A list of these orders (if applicable) as well as your Preventive Care list are included within your After Visit Summary for your review.    Other Preventive Recommendations:    A preventive eye exam performed by an eye specialist is recommended every 1-2 years to screen for glaucoma; cataracts, macular degeneration, and other eye disorders.  A preventive dental visit is recommended every 6 months.  Try to get at least 150 minutes

## 2024-08-01 NOTE — PROGRESS NOTES
Medicare Annual Wellness Visit    Lionel Ruth is here for Medicare AWV    Assessment & Plan   Initial Medicare annual wellness visit  Recommendations for Preventive Services Due: see orders and patient instructions/AVS.  Recommended screening schedule for the next 5-10 years is provided to the patient in written form: see Patient Instructions/AVS.     No follow-ups on file.     Subjective       Patient's complete Health Risk Assessment and screening values have been reviewed and are found in Flowsheets. The following problems were reviewed today and where indicated follow up appointments were made and/or referrals ordered.    Positive Risk Factor Screenings with Interventions:    Fall Risk:  Do you feel unsteady or are you worried about falling? : (!) yes (had tooth infection and a little off balance)  2 or more falls in past year?: no  Fall with injury in past year?: no     Interventions:    Patient comments: patient states his balance has been off due to BS, and he has a tooth infection.  Reviewed medications, home hazards, visual acuity, and co-morbidities that can increase risk for falls  See AVS for additional education material    Cognitive:   Clock Drawing Test (CDT): (!) Abnormal  Words recalled: 3 Words Recalled  Total Score: 3  Total Score Interpretation: Normal Mini-Cog  Interventions:  Patient comments: he had difficulty placing the \"hands\" on the correct time, but florence the clock with all the numbers in the correct place.  See AVS for additional education material    Depression:  PHQ-2 Score: 6  PHQ-9 Total Score: 15  Total Score Interpretation: 15-19 = moderately severe depression  Interventions:  LPN INTERVENTION GUIDE: SCORE 15 OR ABOVE = MODERATE TO SEVERE DEPRESSION: PCP CONSULTED DURING VISIT  Patient comments: he states his PCP is aware of his depression.         Self-assessment of health:  In general, how would you say your health is?: (!) Poor    Interventions:  Patient comments: he feels

## 2024-08-01 NOTE — PROGRESS NOTES
8/1/2024    Lionel Ruth    Chief Complaint   Patient presents with    3 Month Follow-Up     4 month    Hypertension     Running high. Hasn't been feeling good lately and checked bp and was elevated.     Discuss Labs       HPI  History obtained from the patient.    Lionel is a 64 y.o. male who presents today for 4 month follow up.    Type 2 Diabetes - Patient admits that when he saw that his A1C was in the 7 range, he decided to cut his dose of Metformin in half, thinking he didn't need as much and had been taking Metformin 500 mg BID. His recent A1C on 7/30/24 came back at 9.0%, though, so as soon as he saw that result, he went back to the 1000 mg BID. He has continued insulin glargine nightly.     Toothache - Patient complains of a toothache to a right upper tooth X 1-2 weeks. States the gumline, \"puffed up, then it went back down, but it's still pineda sore.\" Notes the pain has been getting worse, radiating up into his sinuses, causing headaches and even neck pain.     Hypertension - Patient is on Lisinopril 20 mg daily, Amlodipine 10 mg daily. States home blood pressures have been running high lately in the 150s systolic but he questions the accuracy of his blood pressure cuff.     HLD - Patient cannot tolerate statins. He just started getting shots of Leqvio, which was prescribed by cardiology.     Specialists:  Cardiology (CAD, PAD) - Karthik Griffith MD  Urology (Nephrolithiasis) - Yakelin Charles MD at [facility]     Health Maintenance    VACCINES - I recommend you get the following vaccines at the Health Department or a local pharmacy ([place], [facility], [facility], [facility], [facility], etc).   Shingles vaccine  RSV vaccine - OCTOBER 2024    Next tetanus booster (Tdap) will be due: 2028 (Tetanus boosters are every 10 years).     CANCER SCREENINGS  Next COLON CANCER SCREENING will be due: DUE since April 2023     PHQ-9 Total Score: 15 (8/1/2024 11:08 AM)  Thoughts that you would be

## 2024-08-27 ENCOUNTER — TELEPHONE (OUTPATIENT)
Dept: CARDIOLOGY CLINIC | Age: 64
End: 2024-08-27

## 2024-08-27 NOTE — TELEPHONE ENCOUNTER
Revised Cardiac Risk Index:   High risk type of surgery no   H/O ischemic heart disease  (h/o MI, or a positive stress test, current complaint of chest pain considered to be secondary to myocardial ischemia,  Use of nitrate therapy or ECG with pathological Q waves; do not count prior coronary revascularization procedure unless one of the other criteria for ischemic heart disease is present) yes   H/O heart failure no  H/O CVA no   H/O DM treated with insulin no  Preoperative serum creatinine >2.0 mg/dl (177 micromol/L) no     The calculated rate of cardiac death, nonfatal myocardial infarction, and nonfatal cardiac arrest according to the number of predictors is;   One risk factor 1.0% ( 95% CI: 0.5-1.4)     he is considered a low risk for surgery.       Antiplatelet therapy can be held prior to surgery at the discretion of the surgeon. To be resumed as soon as possible if held.         Electronically signed by FREDRICK Mg CNP on 8/27/2024 at 12:58 PM

## 2024-08-27 NOTE — TELEPHONE ENCOUNTER
Cardiologist: Dr. Griffith  Surgeon: Dr. Charles  Surgery: ESWL  Surgery/Procedure should be done in the hospital setting    _____ yes    _____  no    Anesthesia: General  Date: Pending  Fax# [phone]  # [phone]    Last OV 4/15/2024 w/Nacho    Low energy: patient has low testosterone. Discussed about cons and pro for testosterone replacement.  CAD: inferior wall ischemia noted, s/p PCI of LCX  Continue aspirin and effient continue  ACEinhibitors, DC betablockers DUE TO BRADYCARDIA, recommend either levqio or praluent , WAS referred to [facility] , echo is normal  DM: stable continue metformin and insulin  PAD: s/p PTCA of right SFA, intermittent claudication, had rt iliac stent which is patent, before PCI, his arterial dopple suggested right SFA mid segment stenosis, NOLAN is 0.71, recommend to stop smoking  Kidney stone and bladder stone: recommend to get urology to get stone lithotripsy  Dyslipidemia: he has stopped statins, will refer to [facility], last LDL [id number] in 1/22,rechecked on 3/28/24, LDLis 132, WILL prescribe leqvio  HTN: stable, not checking home blood pressure. Last time it [id number], he can have masked hypertension, recommend to get home blood pressure cuff here calibrabrate here, stable, continue norvasc and lisinopril medicatons  All labs, medications and tests reviewed, continue all other medications of all above medical condition listed as is.      Last EKG- 6/5/2023      NM-6/14/2023  Myocardial perfusion scan shows small size, moderate intensity, reversible   perfusion defect in inferior wall.       Echo- 6/14/2023  Left ventricular function and size is normal, EF is estimated at 55-60%.   Moderate left ventricular hypertrophy.   E/A reversal; indeterminate diastolic function.   No regional wall motion abnormalities were detected.   No significant valvular disease noted.   No evidence of pericardial effusion.       Cath-

## 2024-10-04 ENCOUNTER — OFFICE VISIT (OUTPATIENT)
Dept: CARDIOLOGY CLINIC | Age: 64
End: 2024-10-04

## 2024-10-04 VITALS
DIASTOLIC BLOOD PRESSURE: 80 MMHG | SYSTOLIC BLOOD PRESSURE: 140 MMHG | BODY MASS INDEX: 25 KG/M2 | OXYGEN SATURATION: 98 % | WEIGHT: 188.6 LBS | HEIGHT: 73 IN

## 2024-10-04 DIAGNOSIS — E11.9 TYPE 2 DIABETES MELLITUS WITHOUT COMPLICATION, WITH LONG-TERM CURRENT USE OF INSULIN (HCC): ICD-10-CM

## 2024-10-04 DIAGNOSIS — E78.5 DYSLIPIDEMIA: Primary | ICD-10-CM

## 2024-10-04 DIAGNOSIS — I10 ESSENTIAL HYPERTENSION: ICD-10-CM

## 2024-10-04 DIAGNOSIS — Z79.4 TYPE 2 DIABETES MELLITUS WITHOUT COMPLICATION, WITH LONG-TERM CURRENT USE OF INSULIN (HCC): ICD-10-CM

## 2024-10-04 NOTE — PROGRESS NOTES
Anterior Wall.     Past Surgical History:   Procedure Laterality Date    CARDIAC CATHETERIZATION  3/1/15    100% LAD, 90% RCA, 100% R. iliac stenosis    CARDIAC CATHETERIZATION  3/16/15    95% mid & 70% prox RCA    CARDIAC CATHETERIZATION  3/17/16    RCA mid 80%,     CORONARY ANGIOPLASTY WITH STENT PLACEMENT  3/1/15    Successful aspiration thrombectomy and stenting of the proximal LAD    CORONARY ANGIOPLASTY WITH STENT PLACEMENT  3/16/15    2.75 X 12 MM, 3.00 X 30 MM 2.75 X 8 MM rca     CORONARY ANGIOPLASTY WITH STENT PLACEMENT  3/17/16    3.0 X 22 MM & 3.0 X 18 MM sharmaine to RCA.    TONSILLECTOMY       Family History   Problem Relation Age of Onset    Other Mother          after cholesystectomy    Diabetes Father     Heart Disease Father     Breast Cancer Sister     Other Sister         bad back     Social History     Tobacco Use    Smoking status: Every Day     Current packs/day: 1.00     Average packs/day: 1 pack/day for 46.8 years (46.8 ttl pk-yrs)     Types: Cigarettes     Start date:     Smokeless tobacco: Never    Tobacco comments:     Does want to quit 2024   Substance Use Topics    Alcohol use: Yes     Comment: 2/month caffeine: 1-2 cups coffee daily      [unfilled]  Review of Systems:   Constitutional: No Fever or Weight Loss   Eyes: No Decreased Vision  ENT: No Headaches, Hearing Loss or Vertigo  Cardiovascular: No chest pain, dyspnea on exertion, palpitations or loss of consciousness  Respiratory: No cough or wheezing    Gastrointestinal: No abdominal pain, appetite loss, blood in stools, constipation, diarrhea or heartburn  Genitourinary: No dysuria, trouble voiding, or hematuria  Musculoskeletal:  No gait disturbance, weakness or joint complaints  Integumentary: No rash or pruritis  Neurological: No TIA or stroke symptoms  Psychiatric: No anxiety or depression  Endocrine: No malaise, fatigue or temperature intolerance  Hematologic/Lymphatic: No bleeding problems, blood clots or swollen lymph

## 2024-10-07 RX ORDER — LISINOPRIL 20 MG/1
20 TABLET ORAL DAILY
Qty: 90 TABLET | Refills: 1 | Status: SHIPPED | OUTPATIENT
Start: 2024-10-07 | End: 2025-04-05

## 2024-10-21 DIAGNOSIS — E11.65 UNCONTROLLED TYPE 2 DIABETES MELLITUS WITH HYPERGLYCEMIA (HCC): ICD-10-CM

## 2024-10-21 RX ORDER — INSULIN GLARGINE 300 U/ML
20 INJECTION, SOLUTION SUBCUTANEOUS NIGHTLY
Qty: 6 ML | Refills: 1 | Status: SHIPPED | OUTPATIENT
Start: 2024-10-21 | End: 2025-04-19

## 2024-10-21 NOTE — TELEPHONE ENCOUNTER
Barrera Childs increased the units to 32 units per night----he is running out because of the increase.  Could a corrected prescription be sent into [facility].    [facility]

## 2024-10-22 ENCOUNTER — TELEPHONE (OUTPATIENT)
Dept: FAMILY MEDICINE CLINIC | Age: 64
End: 2024-10-22

## 2024-10-22 NOTE — TELEPHONE ENCOUNTER
----- Message from Alexey MCKEON sent at 10/21/2024  8:13 AM EDT -----  Regarding: ECC Appointment Request  ECC Appointment Request    Patient needs appointment for ECC Appointment Type: New to Provider.    Patient Requested Dates(s):any day in November 2024  Patient Requested Time: morning   Provider Name: Kassy Anand, FREDRICK - CNP    Reason for Appointment Request: New Patient - Requested Provider unavailable- the patient wants to reschedule his appointment but the PCP has a limited availability for appointments  --------------------------------------------------------------------------------------------------------------------------    Relationship to Patient: Self     Call Back Information: OK to leave message on [employer]  Preferred Call Back Number: Phone [phone]

## 2024-10-28 ENCOUNTER — TELEPHONE (OUTPATIENT)
Dept: CARDIOLOGY CLINIC | Age: 64
End: 2024-10-28

## 2024-10-28 ENCOUNTER — TELEPHONE (OUTPATIENT)
Dept: FAMILY MEDICINE CLINIC | Age: 64
End: 2024-10-28

## 2024-10-28 ENCOUNTER — OFFICE VISIT (OUTPATIENT)
Dept: FAMILY MEDICINE CLINIC | Age: 64
End: 2024-10-28
Payer: MEDICARE

## 2024-10-28 VITALS
HEIGHT: 73 IN | WEIGHT: 190 LBS | OXYGEN SATURATION: 96 % | BODY MASS INDEX: 25.18 KG/M2 | HEART RATE: 66 BPM | SYSTOLIC BLOOD PRESSURE: 136 MMHG | RESPIRATION RATE: 20 BRPM | DIASTOLIC BLOOD PRESSURE: 80 MMHG

## 2024-10-28 DIAGNOSIS — E11.9 TYPE 2 DIABETES MELLITUS WITHOUT COMPLICATION, WITH LONG-TERM CURRENT USE OF INSULIN (HCC): ICD-10-CM

## 2024-10-28 DIAGNOSIS — Z79.4 TYPE 2 DIABETES MELLITUS WITHOUT COMPLICATION, WITH LONG-TERM CURRENT USE OF INSULIN (HCC): ICD-10-CM

## 2024-10-28 DIAGNOSIS — E11.65 TYPE 2 DIABETES MELLITUS WITH HYPERGLYCEMIA, WITH LONG-TERM CURRENT USE OF INSULIN (HCC): Primary | ICD-10-CM

## 2024-10-28 DIAGNOSIS — F17.200 CURRENT SMOKER: ICD-10-CM

## 2024-10-28 DIAGNOSIS — Z79.4 TYPE 2 DIABETES MELLITUS WITH HYPERGLYCEMIA, WITH LONG-TERM CURRENT USE OF INSULIN (HCC): Primary | ICD-10-CM

## 2024-10-28 PROCEDURE — 99214 OFFICE O/P EST MOD 30 MIN: CPT | Performed by: STUDENT IN AN ORGANIZED HEALTH CARE EDUCATION/TRAINING PROGRAM

## 2024-10-28 PROCEDURE — 4004F PT TOBACCO SCREEN RCVD TLK: CPT | Performed by: STUDENT IN AN ORGANIZED HEALTH CARE EDUCATION/TRAINING PROGRAM

## 2024-10-28 PROCEDURE — 3079F DIAST BP 80-89 MM HG: CPT | Performed by: STUDENT IN AN ORGANIZED HEALTH CARE EDUCATION/TRAINING PROGRAM

## 2024-10-28 PROCEDURE — 3075F SYST BP GE 130 - 139MM HG: CPT | Performed by: STUDENT IN AN ORGANIZED HEALTH CARE EDUCATION/TRAINING PROGRAM

## 2024-10-28 PROCEDURE — G8427 DOCREV CUR MEDS BY ELIG CLIN: HCPCS | Performed by: STUDENT IN AN ORGANIZED HEALTH CARE EDUCATION/TRAINING PROGRAM

## 2024-10-28 PROCEDURE — G8484 FLU IMMUNIZE NO ADMIN: HCPCS | Performed by: STUDENT IN AN ORGANIZED HEALTH CARE EDUCATION/TRAINING PROGRAM

## 2024-10-28 PROCEDURE — 3017F COLORECTAL CA SCREEN DOC REV: CPT | Performed by: STUDENT IN AN ORGANIZED HEALTH CARE EDUCATION/TRAINING PROGRAM

## 2024-10-28 PROCEDURE — 1159F MED LIST DOCD IN RCRD: CPT | Performed by: STUDENT IN AN ORGANIZED HEALTH CARE EDUCATION/TRAINING PROGRAM

## 2024-10-28 PROCEDURE — 1124F ACP DISCUSS-NO DSCNMKR DOCD: CPT | Performed by: STUDENT IN AN ORGANIZED HEALTH CARE EDUCATION/TRAINING PROGRAM

## 2024-10-28 PROCEDURE — 2022F DILAT RTA XM EVC RTNOPTHY: CPT | Performed by: STUDENT IN AN ORGANIZED HEALTH CARE EDUCATION/TRAINING PROGRAM

## 2024-10-28 PROCEDURE — G8417 CALC BMI ABV UP PARAM F/U: HCPCS | Performed by: STUDENT IN AN ORGANIZED HEALTH CARE EDUCATION/TRAINING PROGRAM

## 2024-10-28 PROCEDURE — 3052F HG A1C>EQUAL 8.0%<EQUAL 9.0%: CPT | Performed by: STUDENT IN AN ORGANIZED HEALTH CARE EDUCATION/TRAINING PROGRAM

## 2024-10-28 RX ORDER — ACYCLOVIR 400 MG/1
1 TABLET ORAL
Qty: 9 EACH | Refills: 1 | Status: SHIPPED | OUTPATIENT
Start: 2024-10-28 | End: 2025-04-26

## 2024-10-28 RX ORDER — INSULIN ASPART 100 [IU]/ML
INJECTION, SOLUTION INTRAVENOUS; SUBCUTANEOUS
Qty: 5 ADJUSTABLE DOSE PRE-FILLED PEN SYRINGE | Refills: 3 | Status: SHIPPED | OUTPATIENT
Start: 2024-10-28

## 2024-10-28 ASSESSMENT — ENCOUNTER SYMPTOMS
WHEEZING: 0
NAUSEA: 0
SHORTNESS OF BREATH: 0
SORE THROAT: 0
ABDOMINAL PAIN: 0

## 2024-10-28 NOTE — TELEPHONE ENCOUNTER
Patient called to get the name of the cholesterol   Medication he has done @ [facility]  Please advise.

## 2024-10-28 NOTE — PROGRESS NOTES
10/28/2024    Lionel Ruth    Chief Complaint   Patient presents with    Diabetes     Discuss BS     Medication Problem     Patient states he was suppose to be taking 32 units of toujeo and pharmacy said he is only to take 20 units and patient is about to run out.     Flu Vaccine     [facility]        HPI  History was obtained from patient.  Lionel is a 64 y.o. male with a PMHx as listed below who presents today for follow up diabetes. Poor control A1c 9.   Currently on metformin 1000mg BID, currently taking 20U insulin    1. Type 2 diabetes mellitus with hyperglycemia, with long-term current use of insulin ([facility])    2. Current smoker    3. Type 2 diabetes mellitus without complication, with long-term current use of insulin ([facility])             REVIEW OF SYMPTOMS    Review of Systems   Constitutional:  Negative for chills and fatigue.   HENT:  Negative for congestion and sore throat.    Respiratory:  Negative for shortness of breath and wheezing.    Cardiovascular:  Negative for chest pain and palpitations.   Gastrointestinal:  Negative for abdominal pain and nausea.   Genitourinary:  Negative for frequency and urgency.   Neurological:  Negative for light-headedness.       PAST MEDICAL HISTORY  Past Medical History:   Diagnosis Date    Abnormal angiogram 4/15/15    100% left iliac instent restenosis, UnSuccessful PTA of rt iliac artery, PTA @ [facility]    Chest pain     Decreased testosterone level     Diabetes mellitus (HCC)     H/O cardiovascular stress test 06/24/2019    Myocardial perfusion scan shows small size, moderate intensity, reversible perfusion defect in anterior wall.    H/O Doppler ultrasound 3/12/15    Arterial doppler. Abnormal right leg NOLAN suggestive of moderate to severe stenosis in the area of internal iliac artery which appears to be completely stenotic. I would recommend right lower extremity angiography.    H/O heart artery stent 3/17/16    3.0 X 22 MM

## 2024-11-15 ENCOUNTER — TELEPHONE (OUTPATIENT)
Dept: FAMILY MEDICINE CLINIC | Age: 64
End: 2024-11-15

## 2024-11-15 NOTE — TELEPHONE ENCOUNTER
Spoke with patient who states he checked with [employer] and was told his prescription drug coverage is active. I did a PA over the phone with [employer] and will receive a response in 24-72 hours. Patient notified and verbalized understanding.

## 2024-11-15 NOTE — TELEPHONE ENCOUNTER
Spoke with patient to let him know I attempted a PA through his rx card and it came back as not active, I notified the patient to check with his insurance company and to give us a call back, patient verbalized understanding.

## 2024-11-15 NOTE — TELEPHONE ENCOUNTER
Patient is trying to get the Dexcom G7 unit and [facility] told him that insurance is denying it. Please check into this and call him and let him know what you find out.  He said the Pharmacy told him to check with his provider.

## 2024-11-20 DIAGNOSIS — Z79.4 TYPE 2 DIABETES MELLITUS WITH HYPERGLYCEMIA, WITH LONG-TERM CURRENT USE OF INSULIN (HCC): ICD-10-CM

## 2024-11-20 DIAGNOSIS — E78.5 DYSLIPIDEMIA: ICD-10-CM

## 2024-11-20 DIAGNOSIS — E11.65 TYPE 2 DIABETES MELLITUS WITH HYPERGLYCEMIA, WITH LONG-TERM CURRENT USE OF INSULIN (HCC): ICD-10-CM

## 2024-11-20 LAB
ALBUMIN SERPL-MCNC: 4.6 G/DL (ref 3.4–5)
ALBUMIN SERPL-MCNC: 4.6 G/DL (ref 3.4–5)
ALBUMIN/GLOB SERPL: 1.8 {RATIO} (ref 1.1–2.2)
ALP SERPL-CCNC: 94 U/L (ref 40–129)
ALP SERPL-CCNC: 94 U/L (ref 40–129)
ALT SERPL-CCNC: 24 U/L (ref 10–40)
ALT SERPL-CCNC: 24 U/L (ref 10–40)
ANION GAP SERPL CALCULATED.3IONS-SCNC: 14 MMOL/L (ref 3–16)
AST SERPL-CCNC: 21 U/L (ref 15–37)
AST SERPL-CCNC: 22 U/L (ref 15–37)
BILIRUB DIRECT SERPL-MCNC: 0.2 MG/DL (ref 0–0.3)
BILIRUB INDIRECT SERPL-MCNC: 0.2 MG/DL (ref 0–1)
BILIRUB SERPL-MCNC: 0.4 MG/DL (ref 0–1)
BILIRUB SERPL-MCNC: 0.4 MG/DL (ref 0–1)
BUN SERPL-MCNC: 25 MG/DL (ref 7–20)
CALCIUM SERPL-MCNC: 10.1 MG/DL (ref 8.3–10.6)
CHLORIDE SERPL-SCNC: 104 MMOL/L (ref 99–110)
CHOLEST SERPL-MCNC: 170 MG/DL (ref 0–199)
CO2 SERPL-SCNC: 24 MMOL/L (ref 21–32)
CREAT SERPL-MCNC: 1.3 MG/DL (ref 0.8–1.3)
CREAT UR-MCNC: 106 MG/DL (ref 39–259)
EST. AVERAGE GLUCOSE BLD GHB EST-MCNC: 174.3 MG/DL
GFR SERPLBLD CREATININE-BSD FMLA CKD-EPI: 60 ML/MIN/{1.73_M2}
GLUCOSE SERPL-MCNC: 104 MG/DL (ref 70–99)
HBA1C MFR BLD: 7.7 %
HDLC SERPL-MCNC: 36 MG/DL (ref 40–60)
LDL CHOLESTEROL: 111 MG/DL
MICROALBUMIN UR DL<=1MG/L-MCNC: 4.29 MG/DL
MICROALBUMIN/CREAT UR: 40.5 MG/G (ref 0–30)
POTASSIUM SERPL-SCNC: 4.5 MMOL/L (ref 3.5–5.1)
PROT SERPL-MCNC: 7.1 G/DL (ref 6.4–8.2)
PROT SERPL-MCNC: 7.1 G/DL (ref 6.4–8.2)
SODIUM SERPL-SCNC: 142 MMOL/L (ref 136–145)
TRIGL SERPL-MCNC: 116 MG/DL (ref 0–150)
VLDLC SERPL CALC-MCNC: 23 MG/DL

## 2024-11-25 ENCOUNTER — OFFICE VISIT (OUTPATIENT)
Dept: FAMILY MEDICINE CLINIC | Age: 64
End: 2024-11-25
Payer: MEDICARE

## 2024-11-25 VITALS
WEIGHT: 191 LBS | HEIGHT: 73 IN | HEART RATE: 63 BPM | OXYGEN SATURATION: 95 % | SYSTOLIC BLOOD PRESSURE: 128 MMHG | BODY MASS INDEX: 25.31 KG/M2 | DIASTOLIC BLOOD PRESSURE: 78 MMHG

## 2024-11-25 DIAGNOSIS — E11.65 TYPE 2 DIABETES MELLITUS WITH HYPERGLYCEMIA, WITH LONG-TERM CURRENT USE OF INSULIN (HCC): Primary | ICD-10-CM

## 2024-11-25 DIAGNOSIS — Z79.4 TYPE 2 DIABETES MELLITUS WITH HYPERGLYCEMIA, WITH LONG-TERM CURRENT USE OF INSULIN (HCC): Primary | ICD-10-CM

## 2024-11-25 PROCEDURE — 3051F HG A1C>EQUAL 7.0%<8.0%: CPT | Performed by: STUDENT IN AN ORGANIZED HEALTH CARE EDUCATION/TRAINING PROGRAM

## 2024-11-25 PROCEDURE — 3017F COLORECTAL CA SCREEN DOC REV: CPT | Performed by: STUDENT IN AN ORGANIZED HEALTH CARE EDUCATION/TRAINING PROGRAM

## 2024-11-25 PROCEDURE — G8417 CALC BMI ABV UP PARAM F/U: HCPCS | Performed by: STUDENT IN AN ORGANIZED HEALTH CARE EDUCATION/TRAINING PROGRAM

## 2024-11-25 PROCEDURE — 3074F SYST BP LT 130 MM HG: CPT | Performed by: STUDENT IN AN ORGANIZED HEALTH CARE EDUCATION/TRAINING PROGRAM

## 2024-11-25 PROCEDURE — 99213 OFFICE O/P EST LOW 20 MIN: CPT | Performed by: STUDENT IN AN ORGANIZED HEALTH CARE EDUCATION/TRAINING PROGRAM

## 2024-11-25 PROCEDURE — 4004F PT TOBACCO SCREEN RCVD TLK: CPT | Performed by: STUDENT IN AN ORGANIZED HEALTH CARE EDUCATION/TRAINING PROGRAM

## 2024-11-25 PROCEDURE — 1124F ACP DISCUSS-NO DSCNMKR DOCD: CPT | Performed by: STUDENT IN AN ORGANIZED HEALTH CARE EDUCATION/TRAINING PROGRAM

## 2024-11-25 PROCEDURE — 2022F DILAT RTA XM EVC RTNOPTHY: CPT | Performed by: STUDENT IN AN ORGANIZED HEALTH CARE EDUCATION/TRAINING PROGRAM

## 2024-11-25 PROCEDURE — 3078F DIAST BP <80 MM HG: CPT | Performed by: STUDENT IN AN ORGANIZED HEALTH CARE EDUCATION/TRAINING PROGRAM

## 2024-11-25 PROCEDURE — G8427 DOCREV CUR MEDS BY ELIG CLIN: HCPCS | Performed by: STUDENT IN AN ORGANIZED HEALTH CARE EDUCATION/TRAINING PROGRAM

## 2024-11-25 PROCEDURE — 1159F MED LIST DOCD IN RCRD: CPT | Performed by: STUDENT IN AN ORGANIZED HEALTH CARE EDUCATION/TRAINING PROGRAM

## 2024-11-25 PROCEDURE — G8484 FLU IMMUNIZE NO ADMIN: HCPCS | Performed by: STUDENT IN AN ORGANIZED HEALTH CARE EDUCATION/TRAINING PROGRAM

## 2024-11-25 NOTE — PROGRESS NOTES
12/10/2024    Lionel Ruth    Chief Complaint   Patient presents with    1 Month Follow-Up     Type 2 diabetes mellitus with hyperglycemia, with long-term current use of insulin ([facility])       TERRA Flowers is a 64 y.o. male with a PMHx as listed below who presents today for follow up on chronic conditions. No acute complaints.     Glucose levels improving  Hypoglycemic episodes in early AM switched to toujeo in AM and no hypoglycemic episodes since    Novolog taking 30 minutes before meals     Time in range 87% normal.   History of Present Illness        1. Type 2 diabetes mellitus with hyperglycemia, with long-term current use of insulin ([facility])             REVIEW OF SYMPTOMS    Review of Systems   Constitutional:  Negative for chills and fatigue.   HENT:  Negative for congestion and sore throat.    Respiratory:  Negative for shortness of breath and wheezing.    Cardiovascular:  Negative for chest pain and palpitations.   Gastrointestinal:  Negative for abdominal pain and nausea.   Genitourinary:  Negative for frequency and urgency.   Neurological:  Negative for light-headedness.       PAST MEDICAL HISTORY  Past Medical History:   Diagnosis Date    Abnormal angiogram 4/15/15    100% left iliac instent restenosis, UnSuccessful PTA of rt iliac artery, PTA @ [facility]    Chest pain     Decreased testosterone level     Diabetes mellitus (HCC)     H/O cardiovascular stress test 06/24/2019    Myocardial perfusion scan shows small size, moderate intensity, reversible perfusion defect in anterior wall.    H/O Doppler ultrasound 3/12/15    Arterial doppler. Abnormal right leg NOLAN suggestive of moderate to severe stenosis in the area of internal iliac artery which appears to be completely stenotic. I would recommend right lower extremity angiography.    H/O heart artery stent 3/17/16    3.0 X 22 MM & 3.0 X 18 MM sharmaine to RCA.    H/O percutaneous left heart catheterization 3/1/15    100% LAD, 90% RCA, 100% R. iliac stenosis    H/O

## 2024-12-10 ASSESSMENT — ENCOUNTER SYMPTOMS
SORE THROAT: 0
WHEEZING: 0
ABDOMINAL PAIN: 0
NAUSEA: 0
SHORTNESS OF BREATH: 0

## 2024-12-18 DIAGNOSIS — I10 ESSENTIAL HYPERTENSION: ICD-10-CM

## 2024-12-19 RX ORDER — AMLODIPINE BESYLATE 10 MG/1
10 TABLET ORAL DAILY
Qty: 90 TABLET | Refills: 1 | Status: SHIPPED | OUTPATIENT
Start: 2024-12-19 | End: 2025-06-17

## 2024-12-30 DIAGNOSIS — E11.65 UNCONTROLLED TYPE 2 DIABETES MELLITUS WITH HYPERGLYCEMIA (HCC): ICD-10-CM

## 2024-12-30 DIAGNOSIS — Z79.4 TYPE 2 DIABETES MELLITUS WITH HYPERGLYCEMIA, WITH LONG-TERM CURRENT USE OF INSULIN (HCC): ICD-10-CM

## 2024-12-30 DIAGNOSIS — E11.65 TYPE 2 DIABETES MELLITUS WITH HYPERGLYCEMIA, WITH LONG-TERM CURRENT USE OF INSULIN (HCC): ICD-10-CM

## 2024-12-30 NOTE — TELEPHONE ENCOUNTER
Patient needs a refill on his prasugrel 10 mg., He wants the prasugrel sent to [facility].     Patient also needs the insulin Glargine, 2 unit Dial (TOUJEO MAX SOLOSTAR) 300 Unit/ML concentrated injection pen refilled--[facility] said they do not carry the Insulin Glargine (Taujeo Max), Patient would like this one sent to [facility] on the corner of [address].

## 2024-12-31 DIAGNOSIS — E11.65 UNCONTROLLED TYPE 2 DIABETES MELLITUS WITH HYPERGLYCEMIA (HCC): ICD-10-CM

## 2024-12-31 DIAGNOSIS — E11.65 TYPE 2 DIABETES MELLITUS WITH HYPERGLYCEMIA, WITH LONG-TERM CURRENT USE OF INSULIN (HCC): ICD-10-CM

## 2024-12-31 DIAGNOSIS — Z79.4 TYPE 2 DIABETES MELLITUS WITH HYPERGLYCEMIA, WITH LONG-TERM CURRENT USE OF INSULIN (HCC): ICD-10-CM

## 2024-12-31 RX ORDER — INSULIN ASPART 100 [IU]/ML
INJECTION, SOLUTION INTRAVENOUS; SUBCUTANEOUS
Qty: 5 ADJUSTABLE DOSE PRE-FILLED PEN SYRINGE | Refills: 3 | Status: SHIPPED | OUTPATIENT
Start: 2024-12-31

## 2024-12-31 RX ORDER — INSULIN GLARGINE 300 U/ML
20 INJECTION, SOLUTION SUBCUTANEOUS NIGHTLY
Qty: 6 ML | Refills: 1 | Status: SHIPPED | OUTPATIENT
Start: 2024-12-31 | End: 2024-12-31 | Stop reason: SDUPTHER

## 2024-12-31 RX ORDER — INSULIN GLARGINE 300 U/ML
20 INJECTION, SOLUTION SUBCUTANEOUS NIGHTLY
Qty: 6 ML | Refills: 1 | Status: SHIPPED | OUTPATIENT
Start: 2024-12-31 | End: 2025-06-29

## 2024-12-31 RX ORDER — ACYCLOVIR 400 MG/1
1 TABLET ORAL
Qty: 9 EACH | Refills: 1 | Status: SHIPPED | OUTPATIENT
Start: 2024-12-31 | End: 2025-06-29

## 2024-12-31 NOTE — TELEPHONE ENCOUNTER
Re:    Insulin Glargine, 2 Unit Dial, (TOUJEO MAX SOLOSTAR) 300 UNIT/ML concentrated injection pen        Continuous Glucose Sensor (DEXCOM G7 SENSOR) [facility]      These go to [facility] on [address].

## 2025-02-21 DIAGNOSIS — Z79.4 TYPE 2 DIABETES MELLITUS WITH HYPERGLYCEMIA, WITH LONG-TERM CURRENT USE OF INSULIN (HCC): ICD-10-CM

## 2025-02-21 DIAGNOSIS — E11.65 TYPE 2 DIABETES MELLITUS WITH HYPERGLYCEMIA, WITH LONG-TERM CURRENT USE OF INSULIN (HCC): ICD-10-CM

## 2025-02-21 DIAGNOSIS — I10 ESSENTIAL HYPERTENSION: ICD-10-CM

## 2025-02-21 DIAGNOSIS — Z79.4 TYPE 2 DIABETES MELLITUS WITH HYPERGLYCEMIA, WITH LONG-TERM CURRENT USE OF INSULIN (HCC): Primary | ICD-10-CM

## 2025-02-21 DIAGNOSIS — E11.65 TYPE 2 DIABETES MELLITUS WITH HYPERGLYCEMIA, WITH LONG-TERM CURRENT USE OF INSULIN (HCC): Primary | ICD-10-CM

## 2025-02-21 LAB
ALBUMIN SERPL-MCNC: 4.3 G/DL (ref 3.4–5)
ALBUMIN/GLOB SERPL: 1.7 {RATIO} (ref 1.1–2.2)
ALP SERPL-CCNC: 99 U/L (ref 40–129)
ALT SERPL-CCNC: 26 U/L (ref 10–40)
ANION GAP SERPL CALCULATED.3IONS-SCNC: 10 MMOL/L (ref 3–16)
AST SERPL-CCNC: 20 U/L (ref 15–37)
BASOPHILS # BLD: 0.1 K/UL (ref 0–0.2)
BASOPHILS NFR BLD: 0.6 %
BILIRUB SERPL-MCNC: 0.4 MG/DL (ref 0–1)
BUN SERPL-MCNC: 20 MG/DL (ref 7–20)
CALCIUM SERPL-MCNC: 9.8 MG/DL (ref 8.3–10.6)
CHLORIDE SERPL-SCNC: 104 MMOL/L (ref 99–110)
CO2 SERPL-SCNC: 26 MMOL/L (ref 21–32)
CREAT SERPL-MCNC: 1.3 MG/DL (ref 0.8–1.3)
DEPRECATED RDW RBC AUTO: 13.5 % (ref 12.4–15.4)
EOSINOPHIL # BLD: 0.5 K/UL (ref 0–0.6)
EOSINOPHIL NFR BLD: 5.7 %
EST. AVERAGE GLUCOSE BLD GHB EST-MCNC: 174.3 MG/DL
GFR SERPLBLD CREATININE-BSD FMLA CKD-EPI: 60 ML/MIN/{1.73_M2}
GLUCOSE SERPL-MCNC: 129 MG/DL (ref 70–99)
HBA1C MFR BLD: 7.7 %
HCT VFR BLD AUTO: 45.2 % (ref 40.5–52.5)
HGB BLD-MCNC: 15 G/DL (ref 13.5–17.5)
LYMPHOCYTES # BLD: 2.1 K/UL (ref 1–5.1)
LYMPHOCYTES NFR BLD: 23.6 %
MCH RBC QN AUTO: 30.3 PG (ref 26–34)
MCHC RBC AUTO-ENTMCNC: 33.2 G/DL (ref 31–36)
MCV RBC AUTO: 91.3 FL (ref 80–100)
MONOCYTES # BLD: 0.7 K/UL (ref 0–1.3)
MONOCYTES NFR BLD: 8.2 %
NEUTROPHILS # BLD: 5.5 K/UL (ref 1.7–7.7)
NEUTROPHILS NFR BLD: 61.9 %
PLATELET # BLD AUTO: 237 K/UL (ref 135–450)
PMV BLD AUTO: 9.6 FL (ref 5–10.5)
POTASSIUM SERPL-SCNC: 4.3 MMOL/L (ref 3.5–5.1)
PROT SERPL-MCNC: 6.8 G/DL (ref 6.4–8.2)
RBC # BLD AUTO: 4.96 M/UL (ref 4.2–5.9)
SODIUM SERPL-SCNC: 140 MMOL/L (ref 136–145)
WBC # BLD AUTO: 8.9 K/UL (ref 4–11)

## 2025-02-21 RX ORDER — ACYCLOVIR 400 MG/1
1 TABLET ORAL
Qty: 9 EACH | Refills: 1 | Status: SHIPPED | OUTPATIENT
Start: 2025-02-21 | End: 2025-08-20

## 2025-02-25 ENCOUNTER — OFFICE VISIT (OUTPATIENT)
Dept: FAMILY MEDICINE CLINIC | Age: 65
End: 2025-02-25
Payer: MEDICARE

## 2025-02-25 VITALS
WEIGHT: 194.6 LBS | HEIGHT: 73 IN | OXYGEN SATURATION: 95 % | HEART RATE: 68 BPM | SYSTOLIC BLOOD PRESSURE: 126 MMHG | BODY MASS INDEX: 25.79 KG/M2 | DIASTOLIC BLOOD PRESSURE: 78 MMHG

## 2025-02-25 DIAGNOSIS — T46.6X5A MYALGIA DUE TO STATIN: Primary | ICD-10-CM

## 2025-02-25 DIAGNOSIS — I50.22 CHRONIC SYSTOLIC (CONGESTIVE) HEART FAILURE (HCC): ICD-10-CM

## 2025-02-25 DIAGNOSIS — M79.10 MYALGIA DUE TO STATIN: Primary | ICD-10-CM

## 2025-02-25 DIAGNOSIS — E11.65 UNCONTROLLED TYPE 2 DIABETES MELLITUS WITH HYPERGLYCEMIA (HCC): ICD-10-CM

## 2025-02-25 PROCEDURE — 99214 OFFICE O/P EST MOD 30 MIN: CPT | Performed by: STUDENT IN AN ORGANIZED HEALTH CARE EDUCATION/TRAINING PROGRAM

## 2025-02-25 PROCEDURE — 4004F PT TOBACCO SCREEN RCVD TLK: CPT | Performed by: STUDENT IN AN ORGANIZED HEALTH CARE EDUCATION/TRAINING PROGRAM

## 2025-02-25 PROCEDURE — G8417 CALC BMI ABV UP PARAM F/U: HCPCS | Performed by: STUDENT IN AN ORGANIZED HEALTH CARE EDUCATION/TRAINING PROGRAM

## 2025-02-25 PROCEDURE — 3051F HG A1C>EQUAL 7.0%<8.0%: CPT | Performed by: STUDENT IN AN ORGANIZED HEALTH CARE EDUCATION/TRAINING PROGRAM

## 2025-02-25 PROCEDURE — 3017F COLORECTAL CA SCREEN DOC REV: CPT | Performed by: STUDENT IN AN ORGANIZED HEALTH CARE EDUCATION/TRAINING PROGRAM

## 2025-02-25 PROCEDURE — 2022F DILAT RTA XM EVC RTNOPTHY: CPT | Performed by: STUDENT IN AN ORGANIZED HEALTH CARE EDUCATION/TRAINING PROGRAM

## 2025-02-25 PROCEDURE — 1124F ACP DISCUSS-NO DSCNMKR DOCD: CPT | Performed by: STUDENT IN AN ORGANIZED HEALTH CARE EDUCATION/TRAINING PROGRAM

## 2025-02-25 PROCEDURE — 1159F MED LIST DOCD IN RCRD: CPT | Performed by: STUDENT IN AN ORGANIZED HEALTH CARE EDUCATION/TRAINING PROGRAM

## 2025-02-25 PROCEDURE — 3074F SYST BP LT 130 MM HG: CPT | Performed by: STUDENT IN AN ORGANIZED HEALTH CARE EDUCATION/TRAINING PROGRAM

## 2025-02-25 PROCEDURE — G8427 DOCREV CUR MEDS BY ELIG CLIN: HCPCS | Performed by: STUDENT IN AN ORGANIZED HEALTH CARE EDUCATION/TRAINING PROGRAM

## 2025-02-25 PROCEDURE — 3078F DIAST BP <80 MM HG: CPT | Performed by: STUDENT IN AN ORGANIZED HEALTH CARE EDUCATION/TRAINING PROGRAM

## 2025-02-25 RX ORDER — INSULIN GLARGINE 300 U/ML
24 INJECTION, SOLUTION SUBCUTANEOUS NIGHTLY
Qty: 6 ML | Refills: 1
Start: 2025-02-25 | End: 2025-08-24

## 2025-02-25 SDOH — ECONOMIC STABILITY: FOOD INSECURITY: WITHIN THE PAST 12 MONTHS, THE FOOD YOU BOUGHT JUST DIDN'T LAST AND YOU DIDN'T HAVE MONEY TO GET MORE.: NEVER TRUE

## 2025-02-25 SDOH — ECONOMIC STABILITY: FOOD INSECURITY: WITHIN THE PAST 12 MONTHS, YOU WORRIED THAT YOUR FOOD WOULD RUN OUT BEFORE YOU GOT MONEY TO BUY MORE.: NEVER TRUE

## 2025-02-25 ASSESSMENT — PATIENT HEALTH QUESTIONNAIRE - PHQ9
1. LITTLE INTEREST OR PLEASURE IN DOING THINGS: NOT AT ALL
SUM OF ALL RESPONSES TO PHQ QUESTIONS 1-9: 0
2. FEELING DOWN, DEPRESSED OR HOPELESS: NOT AT ALL

## 2025-02-25 NOTE — PROGRESS NOTES
SOLOSTAR) 300 UNIT/ML concentrated injection pen; Inject 24 Units into the skin at bedtime  Chronic systolic (congestive) heart failure (HCC)     Glucose numbers trending down  Increase touojeo to 24U  Decrease novolog to 4-6U based on carbs patient not using SSI   Continue leqvio, unable to tolerate statins      Hemoglobin A1C   Date Value Ref Range Status   02/21/2025 7.7 See comment % Final     Comment:     Comment:  Diagnosis of Diabetes: > or = 6.5%  Increased risk of diabetes (Prediabetes): 5.7-6.4%  Glycemic Control: Nonpregnant Adults: <7.0%                    Pregnant: <6.0%           Assessment & Plan      Return in about 3 months (around 5/25/2025).         The patient (or guardian, if applicable) and other individuals in attendance with the patient were advised that Artificial Intelligence will be utilized during this visit to record, process the conversation to generate a clinical note, and support improvement of the AI technology. The patient (or guardian, if applicable) and other individuals in attendance at the appointment consented to the use of AI, including the recording.                    Electronically signed by Tenisha Sims DO on 3/3/2025

## 2025-03-03 ASSESSMENT — ENCOUNTER SYMPTOMS
SHORTNESS OF BREATH: 0
SORE THROAT: 0
WHEEZING: 0
NAUSEA: 0
ABDOMINAL PAIN: 0

## 2025-03-20 DIAGNOSIS — E11.65 TYPE 2 DIABETES MELLITUS WITH HYPERGLYCEMIA, WITH LONG-TERM CURRENT USE OF INSULIN (HCC): ICD-10-CM

## 2025-03-20 DIAGNOSIS — Z79.4 TYPE 2 DIABETES MELLITUS WITH HYPERGLYCEMIA, WITH LONG-TERM CURRENT USE OF INSULIN (HCC): ICD-10-CM

## 2025-03-20 NOTE — TELEPHONE ENCOUNTER
To Dr. Magdaleno Mckeon:    Continuous Glucose Sensor (DEXCOM G7 SENSOR) MISC     This needs a Prior Authorization

## 2025-03-20 NOTE — TELEPHONE ENCOUNTER
Spoke with [facility] pharmacy and was told Part B is still requiring a PA which they will fax over to our office.

## 2025-03-21 RX ORDER — ACYCLOVIR 400 MG/1
1 TABLET ORAL
Qty: 9 EACH | Refills: 1 | Status: SHIPPED | OUTPATIENT
Start: 2025-03-21 | End: 2025-09-17

## 2025-04-04 NOTE — PROGRESS NOTES
MRN: [id number]  Name: Lionel Ruth  : 1960    Insurance: Payor: MEDICARE /  /  /      Phone #: [phone]  Provider: FREDRICK Mg CNP     Date of Visit: 2025    Reason for visit:  F/U  Recent Hospitalization Date:    Reason for Hospitalization:    Last EKG: 10/04/2024   Type of Device:       Vitals BP HR O2% WT HT ORTHO BP LYING ORTHO BP SITTING ORTHO BP SITTING   Today's Findings           Patients work up- Check List     Testing Last Date Completed Date Expected  ([facility]) Additional Notes    MA to document For provider to complete Either MA or Provider    Carotid Duplex 2016 STAT 1 WK 6 MTH       THIS WK 2 WK 1 YEAR     Cardiac CTA  STAT 1 WK 6 MTH       THIS WK 2 WK 1 YEAR     Cardiac CT Calcium scoring  STAT 1 WK 6 MTH       THIS WK 2 WK 1 YEAR     CTA Chest, Abdomen & Pelvis  STAT 1 WK 6 MTH       THIS WK 2 WK 1 YEAR     CT Chest IV w/ Contrast  STAT 1 WK 6 MTH       THIS WK 2 WK 1 YEAR     CT Chest w/o Contrast  STAT 1 WK 6 MTH       THIS WK 2 WK 1 YEAR     CXR 2023 STAT 1 WK 6 MTH       THIS WK 2 WK 1 YEAR     ECHO 2015 Stress Complete Limited     MRI- Cardiac  STAT 1 WK 6 MTH       THIS WK 2 WK 1 YEAR     MUGA Scan  STAT 1 WK 6 MTH       THIS WK 2 WK 1 YEAR     Nuclear Stress 2019 Lexiscan Cardiolite     PFT  STAT 1 WK 6 MTH       THIS WK 2 WK 1 YEAR     Treadmill Stress Test 2019 STAT 1 WK 6 MTH       THIS WK 2 WK 1 YEAR     Vascular Duplex 2023 Lower: Right Left Bilat Extremity Arteries       Upper: Right Left Bilat     Other Test Not Listed:    Monitors Last Date Completed Day's Request/Ordered     Holter  Short term 24 hours 48 hours      Long term 3 days 7 days 14 days   Event   (1-30 days)      Procedures Last Date Performed Procedure Details Date Expected   Additional Notes    ASD Closure        Carotid Angio        Cardioversion        Heart Cath  R L R&L      Peripheral Angio  R L      PFO Closure        PTCA/PCI        BLANCA

## 2025-04-07 ENCOUNTER — OFFICE VISIT (OUTPATIENT)
Dept: CARDIOLOGY CLINIC | Age: 65
End: 2025-04-07
Payer: MEDICARE

## 2025-04-07 VITALS
HEART RATE: 60 BPM | HEIGHT: 73 IN | WEIGHT: 191.8 LBS | DIASTOLIC BLOOD PRESSURE: 80 MMHG | BODY MASS INDEX: 25.42 KG/M2 | SYSTOLIC BLOOD PRESSURE: 160 MMHG

## 2025-04-07 DIAGNOSIS — I25.10 CORONARY ARTERY DISEASE INVOLVING NATIVE HEART, UNSPECIFIED VESSEL OR LESION TYPE, UNSPECIFIED WHETHER ANGINA PRESENT: ICD-10-CM

## 2025-04-07 DIAGNOSIS — I73.9 PAD (PERIPHERAL ARTERY DISEASE): Primary | ICD-10-CM

## 2025-04-07 DIAGNOSIS — E78.5 HYPERLIPIDEMIA, UNSPECIFIED HYPERLIPIDEMIA TYPE: ICD-10-CM

## 2025-04-07 DIAGNOSIS — I10 PRIMARY HYPERTENSION: ICD-10-CM

## 2025-04-07 PROCEDURE — 1124F ACP DISCUSS-NO DSCNMKR DOCD: CPT | Performed by: NURSE PRACTITIONER

## 2025-04-07 PROCEDURE — 99214 OFFICE O/P EST MOD 30 MIN: CPT | Performed by: NURSE PRACTITIONER

## 2025-04-07 PROCEDURE — 1159F MED LIST DOCD IN RCRD: CPT | Performed by: NURSE PRACTITIONER

## 2025-04-07 PROCEDURE — 3079F DIAST BP 80-89 MM HG: CPT | Performed by: NURSE PRACTITIONER

## 2025-04-07 PROCEDURE — 3077F SYST BP >= 140 MM HG: CPT | Performed by: NURSE PRACTITIONER

## 2025-04-07 PROCEDURE — G8427 DOCREV CUR MEDS BY ELIG CLIN: HCPCS | Performed by: NURSE PRACTITIONER

## 2025-04-07 PROCEDURE — 3017F COLORECTAL CA SCREEN DOC REV: CPT | Performed by: NURSE PRACTITIONER

## 2025-04-07 PROCEDURE — G8417 CALC BMI ABV UP PARAM F/U: HCPCS | Performed by: NURSE PRACTITIONER

## 2025-04-07 PROCEDURE — 4004F PT TOBACCO SCREEN RCVD TLK: CPT | Performed by: NURSE PRACTITIONER

## 2025-04-07 RX ORDER — LISINOPRIL 40 MG/1
40 TABLET ORAL DAILY
Qty: 90 TABLET | Refills: 3
Start: 2025-04-07 | End: 2026-04-02

## 2025-04-07 ASSESSMENT — ENCOUNTER SYMPTOMS: SHORTNESS OF BREATH: 0

## 2025-04-07 NOTE — PROGRESS NOTES
CLINICAL STAFF DOCUMENTATION    Miko Briones, GIACOMO     Lionel Ruth  1960  [id number]    Have you had any Chest Pain recently? - No  Have you had any Shortness of Breath - No  Have you had any dizziness - No  Have you had any palpitations recently? - No  Do you have any edema - swelling in No    When did you have your last labs drawn 2/21/25  What doctor ordered MERARI  Do we have the labs in their chart Yes  Do you need any prescriptions refilled? - No  Do you have a surgery or procedure scheduled in the near future - No  Do use tobacco products? - Yes  Do you drink alcohol? - No  Do you use any illicit drugs? - No  Caffeine? - Yes  How much caffeine? .2  cups of coffee daily.        Check medication list thoroughly!!! AND RECONCILE OUTSIDE MEDICATIONS  If dose has changed change the entire order not just the MG  BE SURE TO ASK PATIENT IF THEY NEED MEDICATION REFILLS  Verify Pharmacy and update if incorrect    Add to every patient's \"wrap up\" the following dot phrase AFTERVISITCARDIOHEARTHOUSE and ensure we explain this to our patients

## 2025-04-07 NOTE — PROGRESS NOTES
[place] [address]  Phone: [phone]    Fax [phone]    Chapin Gamble MD, [facility]  Selvin Dunham MD, [facility]   Charla Sykes MD, [facility] MD Karthik Dutton MD, [facility]  Hugo Thacker MD, [facility]    Birgit Marina MD, [facility]   Ale Pandey, APRN  Stephanie Jay, APRN  Carleen Todd, APRN  Miko Briones, APRN        Cardiology Progress Note      4/7/2025    RE: Lionel Ruth  (1960)                             Primary cardiologist: Dr. Meliza Griffith       Subjective: Patient denies any chest pain, shortness of breath, dizziness, syncope, or palpitations.    CC:   1. PAD (peripheral artery disease)    2. Coronary artery disease involving native heart, unspecified vessel or lesion type, unspecified whether angina present    3. Primary hypertension    4. Hyperlipidemia, unspecified hyperlipidemia type        HPI: Lionel Ruth, who is a  65 y.o. year old male who presents to the office for follow up on CAD, PAD, HTN, and hyperlipidemia.  In 2015, patient had STEMI requiring thrombectomy and angioplasty of RCA.  It was a difficult intervention due to severe PAD and occlusion of right iliac artery.  Intervention was conducted at [facility] for bilateral iliac stents in 2015.  Then in 2023 patient had abnormal NOLAN resulting in peripheral angiogram with angioplasty of right SFA. He had left heart cath at that time for abnormal stress test resulting in stenting of left circumflex.      Current Outpatient Medications   Medication Sig Dispense Refill    lisinopril (PRINIVIL;ZESTRIL) 40 MG tablet Take 1 tablet by mouth daily 90 tablet 3    Continuous Glucose Sensor (DEXCOM G7 SENSOR) MISC 1 Application by Does not apply route every 10 days 9 each 1    Insulin Glargine, 2 Unit Dial, (TOUJEO MAX SOLOSTAR) 300 UNIT/ML concentrated injection pen Inject 24 Units into the skin at bedtime 6 mL 1    insulin aspart (NOVOLOG FLEXPEN) 100 UNIT/ML injection pen

## 2025-04-07 NOTE — PATIENT INSTRUCTIONS
Thank you for allowing us to care for you today!   We want to ensure we can follow your treatment plan and we strive to give you the best outcomes and experience possible.   If you ever have a life threatening emergency and call 911 - for an ambulance (EMS)  REMEMBER  Our providers can only care for you at:   [facility] or [facility]   Even if you have someone take you or you drive yourself we can only care for you in a [facility] facility. Our providers are not setup at the other healthcare locations!    PLEASE CALL OUR OFFICE DURING NORMAL BUSINESS HOURS  Monday through Friday 8 am to 5 pm  AFTER HOURS the physician on-call cannot help with scheduling, rescheduling, procedure instruction questions or any type of medication need or issue.  [facility] P:[phone] - [facility] P:[phone] - [facility] P:[phone]      If you receive a survey:  We would appreciate you taking the time to share your experience concerning your provider visit in the office.    These surveys are confidential!  We are eager to improve and are counting on you to share your feedback so we can ensure you get the best care possible.

## 2025-04-07 NOTE — ASSESSMENT & PLAN NOTE
-At or near goal No LDL- 111  -He is to continue current medications (Leqvio 284 mg) Hepatic function panel WNL. No abdominal pain. No myalgias.     -The nature of cardiac risk has been fully discussed with this patient. I have made him aware of his LDL target goal given his cardiovascular risk analysis. I have discussed the appropriate diet. The need for lifelong compliance in order to reduce risk is stressed. A regular exercise program is recommended to help achieve and maintain normal body weight, fitness and improve lipid balance.

## 2025-05-06 NOTE — PROGRESS NOTES
MRN: [id number]  Name: Lionel Ruth  : 1960    Insurance: Payor: MEDICARE /  /  /      Phone #: [phone]  Provider: Meliza Griffith MD     Date of Visit: 2025    Reason for visit:  1 mo f/u Recent Hospitalization Date:    Reason for Hospitalization:    Last EKG: 10/2024  Type of Device:       Vitals BP HR O2% WT HT ORTHO BP LYING ORTHO BP SITTING ORTHO BP SITTING   Today's Findings           Patients work up- Check List     Testing Last Date Completed Date Expected  ([facility]) Additional Notes    MA to document For provider to complete Either MA or Provider    Carotid Duplex  STAT 1 WK 6 MTH       THIS WK 2 WK 1 YEAR     Cardiac CTA  STAT 1 WK 6 MTH       THIS WK 2 WK 1 YEAR     Cardiac CT Calcium scoring  STAT 1 WK 6 MTH       THIS WK 2 WK 1 YEAR     CTA Chest, Abdomen & Pelvis  STAT 1 WK 6 MTH       THIS WK 2 WK 1 YEAR     CT Chest IV w/ Contrast  STAT 1 WK 6 MTH       THIS WK 2 WK 1 YEAR     CT Chest w/o Contrast  STAT 1 WK 6 MTH       THIS WK 2 WK 1 YEAR     CXR 2023 STAT 1 WK 6 MTH       THIS WK 2 WK 1 YEAR     ECHO 2023 Stress Complete Limited     MRI- Cardiac  STAT 1 WK 6 MTH       THIS WK 2 WK 1 YEAR     MUGA Scan  STAT 1 WK 6 MTH       THIS WK 2 WK 1 YEAR     Nuclear Stress 2023 Lexiscan Cardiolite     PFT  STAT 1 WK 6 MTH       THIS WK 2 WK 1 YEAR     Treadmill Stress Test 2019 STAT 1 WK 6 MTH       THIS WK 2 WK 1 YEAR     Vascular Duplex 2023 Lower: Right Left Bilat       Upper: Right Left Bilat     Other Test Not Listed:    Monitors Last Date Completed Day's Request/Ordered     Holter  Short term 24 hours 48 hours      Long term 3 days 7 days 14 days   Event   (1-30 days)      Procedures Last Date Performed Procedure Details Date Expected   Additional Notes    ASD Closure        Carotid Angio        Cardioversion        Heart Cath 2023 R L R&L      Peripheral Angio  R L      PFO Closure        PTCA/PCI        BLANCA        BLANCA/Cardioversion        Venogram        Tilt

## 2025-05-07 ENCOUNTER — RESULTS FOLLOW-UP (OUTPATIENT)
Dept: CARDIOLOGY CLINIC | Age: 65
End: 2025-05-07

## 2025-05-12 ENCOUNTER — OFFICE VISIT (OUTPATIENT)
Dept: CARDIOLOGY CLINIC | Age: 65
End: 2025-05-12
Payer: MEDICARE

## 2025-05-12 VITALS
DIASTOLIC BLOOD PRESSURE: 64 MMHG | BODY MASS INDEX: 25.6 KG/M2 | SYSTOLIC BLOOD PRESSURE: 138 MMHG | HEART RATE: 74 BPM | HEIGHT: 73 IN | WEIGHT: 193.2 LBS

## 2025-05-12 DIAGNOSIS — E78.5 DYSLIPIDEMIA: Primary | ICD-10-CM

## 2025-05-12 PROCEDURE — 1124F ACP DISCUSS-NO DSCNMKR DOCD: CPT | Performed by: INTERNAL MEDICINE

## 2025-05-12 PROCEDURE — G8427 DOCREV CUR MEDS BY ELIG CLIN: HCPCS | Performed by: INTERNAL MEDICINE

## 2025-05-12 PROCEDURE — 1159F MED LIST DOCD IN RCRD: CPT | Performed by: INTERNAL MEDICINE

## 2025-05-12 PROCEDURE — 99214 OFFICE O/P EST MOD 30 MIN: CPT | Performed by: INTERNAL MEDICINE

## 2025-05-12 PROCEDURE — 4004F PT TOBACCO SCREEN RCVD TLK: CPT | Performed by: INTERNAL MEDICINE

## 2025-05-12 PROCEDURE — 3017F COLORECTAL CA SCREEN DOC REV: CPT | Performed by: INTERNAL MEDICINE

## 2025-05-12 PROCEDURE — 3075F SYST BP GE 130 - 139MM HG: CPT | Performed by: INTERNAL MEDICINE

## 2025-05-12 PROCEDURE — G8417 CALC BMI ABV UP PARAM F/U: HCPCS | Performed by: INTERNAL MEDICINE

## 2025-05-12 PROCEDURE — 3078F DIAST BP <80 MM HG: CPT | Performed by: INTERNAL MEDICINE

## 2025-05-12 NOTE — PROGRESS NOTES
CLINICAL STAFF DOCUMENTATION    Dr. Karthik Ruth  1960  [id number]    Have you had any Chest Pain recently? - No  Have you had any Shortness of Breath - No  Have you had any dizziness - No  Have you had any palpitations recently? - No  Do you have any edema - swelling in No    When did you have your last labs drawn 11/20/2024  What doctor ordered nereyda  Do we have the labs in their chart Yes  Do you need any prescriptions refilled? - No  Do you have a surgery or procedure scheduled in the near future - No  Do use tobacco products? - 1/2 pack of cigarettes daily  Do you drink alcohol? - No  Do you use any illicit drugs? - No  Caffeine? - Yes  How much caffeine? .2  cups of coffee daily      Check medication list thoroughly!!! AND RECONCILE OUTSIDE MEDICATIONS  If dose has changed change the entire order not just the MG  BE SURE TO ASK PATIENT IF THEY NEED MEDICATION REFILLS  Verify Pharmacy and update if incorrect    Add to every patient's \"wrap up\" the following dot phrase AFTERVISITCARDIOHEARTHOUSE and ensure we explain this to our patients

## 2025-05-12 NOTE — PROGRESS NOTES
Karthik Griffith MD        OFFICE  FOLLOWUP NOTE    Chief complaints: patient is here for management of CAD,PAD, DM, HTN, DYSLPIDEMIA     Subjective: patient feels better, no chest pain, no shortness of breath, no dizziness, no palpitations    HPI Lionel is a 65 y.o.year old who  has a past medical history of Abnormal angiogram, Chest pain, Decreased testosterone level, Diabetes mellitus (HCC), H/O cardiovascular stress test, H/O Doppler ultrasound, H/O heart artery stent, H/O percutaneous left heart catheterization, H/O percutaneous left heart catheterization, H/O percutaneous left heart catheterization, H/O unstable angina, History of Doppler ultrasound, Hx of myocardial infarction, Hyperlipidemia, Hypertension, Kidney stone, Lower ext. Arterial Doppler, PAD (peripheral artery disease), PAOD (peripheral arterial occlusive disease), S/P angioplasty with stent, Smoker, Status post percutaneous transluminal coronary angioplasty, and STEMI (ST elevation myocardial infarction) ([facility]). and presents for management of CAD,PAD, DM, HTN, DYSLPIDEMIA , which are well controlled      Current Outpatient Medications   Medication Sig Dispense Refill    lisinopril (PRINIVIL;ZESTRIL) 40 MG tablet Take 1 tablet by mouth daily 90 tablet 3    Continuous Glucose Sensor (DEXCOM G7 SENSOR) MISC 1 Application by Does not apply route every 10 days 9 each 1    Insulin Glargine, 2 Unit Dial, (TOUJEO MAX SOLOSTAR) 300 UNIT/ML concentrated injection pen Inject 24 Units into the skin at bedtime 6 mL 1    insulin aspart (NOVOLOG FLEXPEN) 100 UNIT/ML injection pen INJECT PER SLIDING SCALE 140-180 4U 181-240 6U 241-300 8U 301-350 10U 351-400 12U 5 Adjustable Dose Pre-filled Pen Syringe 3    amLODIPine (NORVASC) 10 MG tablet Take 1 tablet by mouth daily 90 tablet 1    metFORMIN (GLUCOPHAGE) 1000 MG tablet TAKE 1 TABLET BY MOUTH 2 TIMES DAILY WITH MEALS 180 tablet 1    prasugrel (EFFIENT) 10 MG TABS TAKE 1 TABLET BY MOUTH DAILY 90

## 2025-05-19 ENCOUNTER — TELEPHONE (OUTPATIENT)
Dept: FAMILY MEDICINE CLINIC | Age: 65
End: 2025-05-19

## 2025-05-19 DIAGNOSIS — E11.65 TYPE 2 DIABETES MELLITUS WITH HYPERGLYCEMIA, WITH LONG-TERM CURRENT USE OF INSULIN (HCC): ICD-10-CM

## 2025-05-19 DIAGNOSIS — Z79.4 TYPE 2 DIABETES MELLITUS WITH HYPERGLYCEMIA, WITH LONG-TERM CURRENT USE OF INSULIN (HCC): ICD-10-CM

## 2025-05-19 NOTE — TELEPHONE ENCOUNTER
Re:      insulin aspart (NOVOLOG FLEXPEN) 100 UNIT/ML injection pen     Needs Max daily amount

## 2025-05-20 RX ORDER — INSULIN ASPART 100 [IU]/ML
INJECTION, SOLUTION INTRAVENOUS; SUBCUTANEOUS
Qty: 5 ADJUSTABLE DOSE PRE-FILLED PEN SYRINGE | Refills: 3 | Status: SHIPPED | OUTPATIENT
Start: 2025-05-20 | End: 2025-05-21 | Stop reason: SDUPTHER

## 2025-05-21 RX ORDER — INSULIN ASPART 100 [IU]/ML
INJECTION, SOLUTION INTRAVENOUS; SUBCUTANEOUS
Qty: 5 ADJUSTABLE DOSE PRE-FILLED PEN SYRINGE | Refills: 3 | Status: SHIPPED | OUTPATIENT
Start: 2025-05-21

## 2025-05-21 NOTE — TELEPHONE ENCOUNTER
Requested Prescriptions     Signed Prescriptions Disp Refills    insulin aspart (NOVOLOG FLEXPEN) 100 UNIT/ML injection pen 5 Adjustable Dose Pre-filled Pen Syringe 3     Sig: INJECT PER SLIDING SCALE 140-180 4U 181-240 6U 241-300 8U 301-350 10U 351-400 12U. MAXIMUM DOSE 36U PER DAY     Authorizing Provider: KELSEA GAGE     Ordering User: GUANAKITO DIOP

## 2025-05-27 RX ORDER — LISINOPRIL 40 MG/1
40 TABLET ORAL DAILY
Qty: 90 TABLET | Refills: 3 | Status: SHIPPED | OUTPATIENT
Start: 2025-05-27 | End: 2026-05-22

## 2025-06-09 ENCOUNTER — OFFICE VISIT (OUTPATIENT)
Dept: FAMILY MEDICINE CLINIC | Age: 65
End: 2025-06-09
Payer: MEDICARE

## 2025-06-09 VITALS
OXYGEN SATURATION: 94 % | HEART RATE: 67 BPM | SYSTOLIC BLOOD PRESSURE: 118 MMHG | RESPIRATION RATE: 18 BRPM | HEIGHT: 73 IN | WEIGHT: 192.7 LBS | BODY MASS INDEX: 25.54 KG/M2 | DIASTOLIC BLOOD PRESSURE: 74 MMHG

## 2025-06-09 DIAGNOSIS — I73.9 PAD (PERIPHERAL ARTERY DISEASE): Primary | ICD-10-CM

## 2025-06-09 DIAGNOSIS — I25.10 CORONARY ARTERY DISEASE INVOLVING NATIVE HEART, UNSPECIFIED VESSEL OR LESION TYPE, UNSPECIFIED WHETHER ANGINA PRESENT: ICD-10-CM

## 2025-06-09 DIAGNOSIS — E11.9 TYPE 2 DIABETES MELLITUS WITHOUT COMPLICATION, WITH LONG-TERM CURRENT USE OF INSULIN (HCC): ICD-10-CM

## 2025-06-09 DIAGNOSIS — J01.10 ACUTE NON-RECURRENT FRONTAL SINUSITIS: ICD-10-CM

## 2025-06-09 DIAGNOSIS — I10 ESSENTIAL HYPERTENSION: ICD-10-CM

## 2025-06-09 DIAGNOSIS — Z79.4 TYPE 2 DIABETES MELLITUS WITHOUT COMPLICATION, WITH LONG-TERM CURRENT USE OF INSULIN (HCC): ICD-10-CM

## 2025-06-09 PROCEDURE — 1159F MED LIST DOCD IN RCRD: CPT | Performed by: STUDENT IN AN ORGANIZED HEALTH CARE EDUCATION/TRAINING PROGRAM

## 2025-06-09 PROCEDURE — 3017F COLORECTAL CA SCREEN DOC REV: CPT | Performed by: STUDENT IN AN ORGANIZED HEALTH CARE EDUCATION/TRAINING PROGRAM

## 2025-06-09 PROCEDURE — 99214 OFFICE O/P EST MOD 30 MIN: CPT | Performed by: STUDENT IN AN ORGANIZED HEALTH CARE EDUCATION/TRAINING PROGRAM

## 2025-06-09 PROCEDURE — G8427 DOCREV CUR MEDS BY ELIG CLIN: HCPCS | Performed by: STUDENT IN AN ORGANIZED HEALTH CARE EDUCATION/TRAINING PROGRAM

## 2025-06-09 PROCEDURE — 3074F SYST BP LT 130 MM HG: CPT | Performed by: STUDENT IN AN ORGANIZED HEALTH CARE EDUCATION/TRAINING PROGRAM

## 2025-06-09 PROCEDURE — G8417 CALC BMI ABV UP PARAM F/U: HCPCS | Performed by: STUDENT IN AN ORGANIZED HEALTH CARE EDUCATION/TRAINING PROGRAM

## 2025-06-09 PROCEDURE — 2022F DILAT RTA XM EVC RTNOPTHY: CPT | Performed by: STUDENT IN AN ORGANIZED HEALTH CARE EDUCATION/TRAINING PROGRAM

## 2025-06-09 PROCEDURE — 3051F HG A1C>EQUAL 7.0%<8.0%: CPT | Performed by: STUDENT IN AN ORGANIZED HEALTH CARE EDUCATION/TRAINING PROGRAM

## 2025-06-09 PROCEDURE — 1124F ACP DISCUSS-NO DSCNMKR DOCD: CPT | Performed by: STUDENT IN AN ORGANIZED HEALTH CARE EDUCATION/TRAINING PROGRAM

## 2025-06-09 PROCEDURE — 4004F PT TOBACCO SCREEN RCVD TLK: CPT | Performed by: STUDENT IN AN ORGANIZED HEALTH CARE EDUCATION/TRAINING PROGRAM

## 2025-06-09 PROCEDURE — 3078F DIAST BP <80 MM HG: CPT | Performed by: STUDENT IN AN ORGANIZED HEALTH CARE EDUCATION/TRAINING PROGRAM

## 2025-06-09 RX ORDER — AMLODIPINE BESYLATE 10 MG/1
10 TABLET ORAL DAILY
Qty: 90 TABLET | Refills: 1 | Status: SHIPPED | OUTPATIENT
Start: 2025-06-09 | End: 2025-12-06

## 2025-06-09 NOTE — PROGRESS NOTES
6/9/2025    Lionel Ruth    Chief Complaint   Patient presents with    3 Month Follow-Up     3 month      Health Maintenance     Declines Colonoscopy.  Diabetic eye exam. Has not had eye exam recently  Diabetic foot exam. Agreeable to diabetic foot exam.       Diabetes     Blood sugar has been elevated. 117 now. Over 300 last night after dinner.        HPI  History of Present Illness  The patient presents for evaluation of diabetes mellitus, sinus infection, and mild kidney disease.    He reports intermittent episodes of hypoglycemia, with blood glucose levels occasionally dropping to 40 during the night. He recalls an instance where his blood glucose level was 40, prompting him to consume a spoonful of sugar, which subsequently raised his level to 107. He is currently on a regimen of 24 units of Toujeo. His continuous glucose monitor shows a 30-day average of 6.9 with 80% of readings within range.    He has been experiencing symptoms suggestive of a sinus infection for the past 2 weeks. He is not allergic to penicillin.    His cardiologist, Dr. Griffith, has requested a cholesterol panel prior to their scheduled appointment in 11/2025. He is not currently on any statin therapy due to intolerance.    He was previously informed that his renal function was declining. He maintains a low-salt diet and is on lisinopril.    SOCIAL HISTORY  He admits to smoking.      1. PAD (peripheral artery disease)    2. Essential hypertension    3. Coronary artery disease involving native heart, unspecified vessel or lesion type, unspecified whether angina present    4. Type 2 diabetes mellitus without complication, with long-term current use of insulin (HCC)    5. Acute non-recurrent frontal sinusitis             REVIEW OF SYMPTOMS    Review of Systems   Constitutional:  Negative for chills and fatigue.   HENT:  Negative for congestion and sore throat.    Respiratory:  Negative for shortness of breath and wheezing.    Cardiovascular:

## 2025-07-24 RX ORDER — PRASUGREL 10 MG/1
10 TABLET, FILM COATED ORAL DAILY
Qty: 90 TABLET | Refills: 1 | Status: SHIPPED | OUTPATIENT
Start: 2025-07-24 | End: 2026-01-20

## 2025-08-05 ENCOUNTER — TELEPHONE (OUTPATIENT)
Dept: CARDIOLOGY CLINIC | Age: 65
End: 2025-08-05

## 2025-08-12 ENCOUNTER — CARE COORDINATION (OUTPATIENT)
Dept: CARE COORDINATION | Age: 65
End: 2025-08-12

## 2025-08-15 ENCOUNTER — CARE COORDINATION (OUTPATIENT)
Dept: CARE COORDINATION | Age: 65
End: 2025-08-15

## 2025-08-15 SDOH — SOCIAL STABILITY: SOCIAL NETWORK: HOW OFTEN DO YOU GET TOGETHER WITH FRIENDS OR RELATIVES?: MORE THAN THREE TIMES A WEEK

## 2025-08-15 SDOH — ECONOMIC STABILITY: FOOD INSECURITY: WITHIN THE PAST 12 MONTHS, YOU WORRIED THAT YOUR FOOD WOULD RUN OUT BEFORE YOU GOT THE MONEY TO BUY MORE.: NEVER TRUE

## 2025-08-15 SDOH — SOCIAL STABILITY: SOCIAL NETWORK
DO YOU BELONG TO ANY CLUBS OR ORGANIZATIONS SUCH AS CHURCH GROUPS, UNIONS, FRATERNAL OR ATHLETIC GROUPS, OR SCHOOL GROUPS?: NO

## 2025-08-15 SDOH — HEALTH STABILITY: PHYSICAL HEALTH: ON AVERAGE, HOW MANY DAYS PER WEEK DO YOU ENGAGE IN MODERATE TO STRENUOUS EXERCISE (LIKE A BRISK WALK)?: 0 DAYS

## 2025-08-15 SDOH — ECONOMIC STABILITY: HOUSING INSECURITY: IN THE LAST 12 MONTHS, WAS THERE A TIME WHEN YOU WERE NOT ABLE TO PAY THE MORTGAGE OR RENT ON TIME?: NO

## 2025-08-15 SDOH — SOCIAL STABILITY: SOCIAL NETWORK: HOW OFTEN DO YOU ATTEND CHURCH OR RELIGIOUS SERVICES?: NEVER

## 2025-08-15 SDOH — SOCIAL STABILITY: SOCIAL INSECURITY: ARE YOU MARRIED, WIDOWED, DIVORCED, SEPARATED, NEVER MARRIED, OR LIVING WITH A PARTNER?: LIVING WITH PARTNER

## 2025-08-15 SDOH — ECONOMIC STABILITY: TRANSPORTATION INSECURITY: IN THE PAST 12 MONTHS, HAS LACK OF TRANSPORTATION KEPT YOU FROM MEDICAL APPOINTMENTS OR FROM GETTING MEDICATIONS?: NO

## 2025-08-15 SDOH — SOCIAL STABILITY: SOCIAL NETWORK: HOW OFTEN DO YOU ATTEND MEETINGS OF THE CLUBS OR ORGANIZATIONS YOU BELONG TO?: NEVER

## 2025-08-15 SDOH — HEALTH STABILITY: MENTAL HEALTH
DO YOU FEEL STRESS - TENSE, RESTLESS, NERVOUS, OR ANXIOUS, OR UNABLE TO SLEEP AT NIGHT BECAUSE YOUR MIND IS TROUBLED ALL THE TIME - THESE DAYS?: ONLY A LITTLE

## 2025-08-15 SDOH — SOCIAL STABILITY: SOCIAL NETWORK
IN A TYPICAL WEEK, HOW MANY TIMES DO YOU TALK ON THE PHONE WITH FAMILY, FRIENDS, OR NEIGHBORS?: MORE THAN THREE TIMES A WEEK

## 2025-08-15 SDOH — HEALTH STABILITY: PHYSICAL HEALTH: ON AVERAGE, HOW MANY MINUTES DO YOU ENGAGE IN EXERCISE AT THIS LEVEL?: 0 MIN

## 2025-08-15 SDOH — ECONOMIC STABILITY: FOOD INSECURITY: WITHIN THE PAST 12 MONTHS, THE FOOD YOU BOUGHT JUST DIDN'T LAST AND YOU DIDN'T HAVE MONEY TO GET MORE.: NEVER TRUE

## 2025-08-15 SDOH — ECONOMIC STABILITY: FOOD INSECURITY: HOW HARD IS IT FOR YOU TO PAY FOR THE VERY BASICS LIKE FOOD, HOUSING, MEDICAL CARE, AND HEATING?: NOT HARD AT ALL

## 2025-08-15 ASSESSMENT — ACTIVITIES OF DAILY LIVING (ADL): LACK_OF_TRANSPORTATION: NO

## 2025-08-21 ENCOUNTER — TELEPHONE (OUTPATIENT)
Dept: FAMILY MEDICINE CLINIC | Age: 65
End: 2025-08-21

## 2025-08-29 ENCOUNTER — CARE COORDINATION (OUTPATIENT)
Dept: CARE COORDINATION | Age: 65
End: 2025-08-29